# Patient Record
Sex: FEMALE | Race: WHITE | Employment: OTHER | URBAN - METROPOLITAN AREA
[De-identification: names, ages, dates, MRNs, and addresses within clinical notes are randomized per-mention and may not be internally consistent; named-entity substitution may affect disease eponyms.]

---

## 2017-01-20 PROCEDURE — 87077 CULTURE AEROBIC IDENTIFY: CPT | Performed by: UROLOGY

## 2017-01-20 PROCEDURE — 87186 SC STD MICRODIL/AGAR DIL: CPT | Performed by: UROLOGY

## 2017-01-20 PROCEDURE — 87086 URINE CULTURE/COLONY COUNT: CPT | Performed by: UROLOGY

## 2017-05-19 PROCEDURE — 81003 URINALYSIS AUTO W/O SCOPE: CPT | Performed by: UROLOGY

## 2017-06-14 ENCOUNTER — APPOINTMENT (OUTPATIENT)
Dept: ULTRASOUND IMAGING | Facility: HOSPITAL | Age: 82
End: 2017-06-14
Attending: INTERNAL MEDICINE
Payer: MEDICARE

## 2017-06-14 ENCOUNTER — APPOINTMENT (OUTPATIENT)
Dept: CT IMAGING | Facility: HOSPITAL | Age: 82
End: 2017-06-14
Attending: EMERGENCY MEDICINE
Payer: MEDICARE

## 2017-06-14 ENCOUNTER — APPOINTMENT (OUTPATIENT)
Dept: GENERAL RADIOLOGY | Facility: HOSPITAL | Age: 82
End: 2017-06-14
Attending: EMERGENCY MEDICINE
Payer: MEDICARE

## 2017-06-14 ENCOUNTER — HOSPITAL ENCOUNTER (OUTPATIENT)
Facility: HOSPITAL | Age: 82
Setting detail: OBSERVATION
Discharge: HOME | End: 2017-06-15
Attending: EMERGENCY MEDICINE | Admitting: INTERNAL MEDICINE
Payer: MEDICARE

## 2017-06-14 DIAGNOSIS — I48.20 ATRIAL FIBRILLATION, CHRONIC (HCC): ICD-10-CM

## 2017-06-14 DIAGNOSIS — R10.9 ABDOMINAL PAIN OF UNKNOWN ETIOLOGY: ICD-10-CM

## 2017-06-14 DIAGNOSIS — K59.00 CONSTIPATION, UNSPECIFIED CONSTIPATION TYPE: ICD-10-CM

## 2017-06-14 DIAGNOSIS — R07.9 ACUTE CHEST PAIN: Primary | ICD-10-CM

## 2017-06-14 PROCEDURE — 81003 URINALYSIS AUTO W/O SCOPE: CPT | Performed by: INTERNAL MEDICINE

## 2017-06-14 PROCEDURE — 80162 ASSAY OF DIGOXIN TOTAL: CPT | Performed by: EMERGENCY MEDICINE

## 2017-06-14 PROCEDURE — 99285 EMERGENCY DEPT VISIT HI MDM: CPT

## 2017-06-14 PROCEDURE — 84484 ASSAY OF TROPONIN QUANT: CPT | Performed by: EMERGENCY MEDICINE

## 2017-06-14 PROCEDURE — 85025 COMPLETE CBC W/AUTO DIFF WBC: CPT | Performed by: EMERGENCY MEDICINE

## 2017-06-14 PROCEDURE — 85730 THROMBOPLASTIN TIME PARTIAL: CPT | Performed by: EMERGENCY MEDICINE

## 2017-06-14 PROCEDURE — 93970 EXTREMITY STUDY: CPT | Performed by: INTERNAL MEDICINE

## 2017-06-14 PROCEDURE — 84484 ASSAY OF TROPONIN QUANT: CPT | Performed by: HOSPITALIST

## 2017-06-14 PROCEDURE — 80162 ASSAY OF DIGOXIN TOTAL: CPT | Performed by: INTERNAL MEDICINE

## 2017-06-14 PROCEDURE — 86901 BLOOD TYPING SEROLOGIC RH(D): CPT | Performed by: EMERGENCY MEDICINE

## 2017-06-14 PROCEDURE — 86850 RBC ANTIBODY SCREEN: CPT | Performed by: EMERGENCY MEDICINE

## 2017-06-14 PROCEDURE — 93005 ELECTROCARDIOGRAM TRACING: CPT

## 2017-06-14 PROCEDURE — 74177 CT ABD & PELVIS W/CONTRAST: CPT | Performed by: EMERGENCY MEDICINE

## 2017-06-14 PROCEDURE — 83690 ASSAY OF LIPASE: CPT | Performed by: EMERGENCY MEDICINE

## 2017-06-14 PROCEDURE — 85652 RBC SED RATE AUTOMATED: CPT | Performed by: INTERNAL MEDICINE

## 2017-06-14 PROCEDURE — 96360 HYDRATION IV INFUSION INIT: CPT

## 2017-06-14 PROCEDURE — 71010 XR CHEST AP PORTABLE  (CPT=71010): CPT | Performed by: EMERGENCY MEDICINE

## 2017-06-14 PROCEDURE — 83880 ASSAY OF NATRIURETIC PEPTIDE: CPT | Performed by: INTERNAL MEDICINE

## 2017-06-14 PROCEDURE — 93010 ELECTROCARDIOGRAM REPORT: CPT

## 2017-06-14 PROCEDURE — 96361 HYDRATE IV INFUSION ADD-ON: CPT

## 2017-06-14 PROCEDURE — 82550 ASSAY OF CK (CPK): CPT | Performed by: HOSPITALIST

## 2017-06-14 PROCEDURE — 80053 COMPREHEN METABOLIC PANEL: CPT | Performed by: EMERGENCY MEDICINE

## 2017-06-14 PROCEDURE — 85610 PROTHROMBIN TIME: CPT | Performed by: EMERGENCY MEDICINE

## 2017-06-14 PROCEDURE — 84443 ASSAY THYROID STIM HORMONE: CPT | Performed by: INTERNAL MEDICINE

## 2017-06-14 PROCEDURE — 86900 BLOOD TYPING SEROLOGIC ABO: CPT | Performed by: EMERGENCY MEDICINE

## 2017-06-14 RX ORDER — WARFARIN SODIUM 2 MG/1
3.5 TABLET ORAL
COMMUNITY
End: 2018-03-19

## 2017-06-14 RX ORDER — ALPRAZOLAM 0.25 MG/1
0.25 TABLET ORAL NIGHTLY PRN
Status: DISCONTINUED | OUTPATIENT
Start: 2017-06-14 | End: 2017-06-15

## 2017-06-14 RX ORDER — WARFARIN SODIUM 2 MG/1
2 TABLET ORAL AS DIRECTED
COMMUNITY
End: 2017-08-30

## 2017-06-14 RX ORDER — ALPRAZOLAM 0.25 MG/1
0.25 TABLET ORAL NIGHTLY PRN
COMMUNITY
End: 2017-08-29

## 2017-06-14 RX ORDER — BUPRENORPHINE HCL 8 MG/1
1 TABLET SUBLINGUAL DAILY
COMMUNITY

## 2017-06-14 RX ORDER — CETIRIZINE HYDROCHLORIDE 10 MG/1
10 TABLET ORAL NIGHTLY
COMMUNITY

## 2017-06-14 RX ORDER — DONEPEZIL HYDROCHLORIDE 10 MG/1
10 TABLET, FILM COATED ORAL NIGHTLY
Status: DISCONTINUED | OUTPATIENT
Start: 2017-06-14 | End: 2017-06-15

## 2017-06-14 RX ORDER — BISACODYL 10 MG
10 SUPPOSITORY, RECTAL RECTAL
Status: DISCONTINUED | OUTPATIENT
Start: 2017-06-14 | End: 2017-06-15

## 2017-06-14 RX ORDER — HYDROMORPHONE HYDROCHLORIDE 1 MG/ML
0.5 INJECTION, SOLUTION INTRAMUSCULAR; INTRAVENOUS; SUBCUTANEOUS EVERY 30 MIN PRN
Status: ACTIVE | OUTPATIENT
Start: 2017-06-14 | End: 2017-06-14

## 2017-06-14 RX ORDER — CHOLESTYRAMINE LIGHT 4 G/5.7G
4 POWDER, FOR SUSPENSION ORAL DAILY
Status: DISCONTINUED | OUTPATIENT
Start: 2017-06-14 | End: 2017-06-15

## 2017-06-14 RX ORDER — GABAPENTIN 300 MG/1
600 CAPSULE ORAL NIGHTLY
COMMUNITY

## 2017-06-14 RX ORDER — TRIAMTERENE AND HYDROCHLOROTHIAZIDE 37.5; 25 MG/1; MG/1
1 CAPSULE ORAL EVERY MORNING
COMMUNITY
End: 2017-12-09

## 2017-06-14 RX ORDER — WARFARIN SODIUM 2 MG/1
4 TABLET ORAL
Status: COMPLETED | OUTPATIENT
Start: 2017-06-14 | End: 2017-06-14

## 2017-06-14 RX ORDER — POTASSIUM CHLORIDE 20 MEQ/1
40 TABLET, EXTENDED RELEASE ORAL EVERY 4 HOURS
Status: COMPLETED | OUTPATIENT
Start: 2017-06-14 | End: 2017-06-14

## 2017-06-14 RX ORDER — SODIUM PHOSPHATE, DIBASIC AND SODIUM PHOSPHATE, MONOBASIC 7; 19 G/133ML; G/133ML
1 ENEMA RECTAL ONCE AS NEEDED
Status: DISCONTINUED | OUTPATIENT
Start: 2017-06-14 | End: 2017-06-15

## 2017-06-14 RX ORDER — TIZANIDINE 2 MG/1
2 TABLET ORAL NIGHTLY
Status: DISCONTINUED | OUTPATIENT
Start: 2017-06-14 | End: 2017-06-15

## 2017-06-14 RX ORDER — POLYETHYLENE GLYCOL 3350 17 G/17G
17 POWDER, FOR SOLUTION ORAL DAILY PRN
Status: DISCONTINUED | OUTPATIENT
Start: 2017-06-14 | End: 2017-06-15

## 2017-06-14 RX ORDER — ASPIRIN 81 MG/1
324 TABLET, CHEWABLE ORAL ONCE
Status: COMPLETED | OUTPATIENT
Start: 2017-06-14 | End: 2017-06-14

## 2017-06-14 RX ORDER — MONTELUKAST SODIUM 10 MG/1
10 TABLET ORAL NIGHTLY
COMMUNITY

## 2017-06-14 RX ORDER — DICYCLOMINE HCL 20 MG
40 TABLET ORAL
Status: DISCONTINUED | OUTPATIENT
Start: 2017-06-14 | End: 2017-06-15

## 2017-06-14 RX ORDER — ACETAMINOPHEN 325 MG/1
650 TABLET ORAL EVERY 6 HOURS PRN
Status: DISCONTINUED | OUTPATIENT
Start: 2017-06-14 | End: 2017-06-15

## 2017-06-14 RX ORDER — AMITRIPTYLINE HYDROCHLORIDE 50 MG/1
50 TABLET, FILM COATED ORAL NIGHTLY
Status: DISCONTINUED | OUTPATIENT
Start: 2017-06-14 | End: 2017-06-15

## 2017-06-14 RX ORDER — MONTELUKAST SODIUM 10 MG/1
10 TABLET ORAL NIGHTLY
Status: DISCONTINUED | OUTPATIENT
Start: 2017-06-14 | End: 2017-06-15

## 2017-06-14 RX ORDER — DILTIAZEM HYDROCHLORIDE 180 MG/1
180 CAPSULE, EXTENDED RELEASE ORAL 2 TIMES DAILY
Status: DISCONTINUED | OUTPATIENT
Start: 2017-06-14 | End: 2017-06-15

## 2017-06-14 RX ORDER — DIGOXIN 125 MCG
125 TABLET ORAL DAILY
Status: DISCONTINUED | OUTPATIENT
Start: 2017-06-14 | End: 2017-06-15

## 2017-06-14 RX ORDER — ONDANSETRON 2 MG/ML
4 INJECTION INTRAMUSCULAR; INTRAVENOUS EVERY 4 HOURS PRN
Status: DISCONTINUED | OUTPATIENT
Start: 2017-06-14 | End: 2017-06-14

## 2017-06-14 RX ORDER — GABAPENTIN 300 MG/1
600 CAPSULE ORAL NIGHTLY
Status: DISCONTINUED | OUTPATIENT
Start: 2017-06-14 | End: 2017-06-15

## 2017-06-14 RX ORDER — DOCUSATE SODIUM 100 MG/1
100 CAPSULE, LIQUID FILLED ORAL 2 TIMES DAILY
Status: DISCONTINUED | OUTPATIENT
Start: 2017-06-14 | End: 2017-06-15

## 2017-06-14 RX ORDER — METHENAMINE HIPPURATE 1000 MG/1
1 TABLET ORAL DAILY
Status: DISCONTINUED | OUTPATIENT
Start: 2017-06-14 | End: 2017-06-15

## 2017-06-14 RX ORDER — TRIAMTERENE AND HYDROCHLOROTHIAZIDE 37.5; 25 MG/1; MG/1
1 CAPSULE ORAL EVERY MORNING
Status: DISCONTINUED | OUTPATIENT
Start: 2017-06-14 | End: 2017-06-15

## 2017-06-14 RX ORDER — SODIUM CHLORIDE 9 MG/ML
1000 INJECTION, SOLUTION INTRAVENOUS ONCE
Status: COMPLETED | OUTPATIENT
Start: 2017-06-14 | End: 2017-06-14

## 2017-06-14 RX ORDER — SODIUM CHLORIDE 9 MG/ML
INJECTION, SOLUTION INTRAVENOUS CONTINUOUS
Status: ACTIVE | OUTPATIENT
Start: 2017-06-14 | End: 2017-06-14

## 2017-06-14 RX ORDER — CETIRIZINE HYDROCHLORIDE 10 MG/1
10 TABLET ORAL NIGHTLY
Status: DISCONTINUED | OUTPATIENT
Start: 2017-06-14 | End: 2017-06-15

## 2017-06-14 RX ORDER — ONDANSETRON 2 MG/ML
4 INJECTION INTRAMUSCULAR; INTRAVENOUS EVERY 6 HOURS PRN
Status: DISCONTINUED | OUTPATIENT
Start: 2017-06-14 | End: 2017-06-15

## 2017-06-14 NOTE — ED PROVIDER NOTES
Patient Seen in: [facility] Emergency Department    History   Patient presents with:  GI Bleeding (gastrointestinal)    Stated Complaint: GI bleeding    HPI    Patient is a 66-year-old female who states that since yesterday she has had abdominal jj nightly. Warfarin Sodium 2 MG Oral Tab,  Take 4 mg by mouth 3 (three) times a week. Mon, wed, fri   Warfarin Sodium 2 MG Oral Tab,  Take 2 mg by mouth As Directed. Tues, thurs, sat, sun   gabapentin 300 MG Oral Cap,  Take 600 mg by mouth nightly.    cetir Comment: none      Review of Systems    Positive for stated complaint: GI bleeding  Other systems are as noted in HPI. Constitutional and vital signs reviewed. All other systems reviewed and negative except as noted above.     PSFH elements reviewed f for the following:     PTT 40.6 (*)     All other components within normal limits    Narrative: The aPTT Heparin Therapeutic Range is approximately 65- 104 seconds. The therapeutic range has been validated against 0.3-0.7 heparin anti-Xa units/mL. Fibrillation  Reading: Atrial fibrillation, nonspecific ST changes          CT abdomen pelvisCONCLUSION:  Large amount of gas and stool throughout the colon limiting evaluation for underlying colonic mass .     Colonic diverticulosis.     Mild splenomegaly

## 2017-06-14 NOTE — ED INITIAL ASSESSMENT (HPI)
Pt c/o dark stools since last night and abdominal pain that radiates to left shoulder pain since this AM. Denies N/V/fever.  + diarrhea

## 2017-06-14 NOTE — PLAN OF CARE
ASSUMED CARE OF PATIENT WHO IS A/O X 3 BUT SOMEWHAT FORGETFUL. LUNGS CLEAR ON RA. TELE IN AFIB 60S/70S. DAUGHTER AT BED SIDE. K NOTED OF 3.5 AND REPLACED. ISOLATION INITIATED FOR CURRENT LOOSE STOOLS. REPEAT MYOCAS FOR 1530 AND 2130.

## 2017-06-14 NOTE — H&P
[facility] Hospitalist H&P       CC: abdominal pain and chest pain    PCP: Abhilash Hickman MD    History of Present Illness:   Pt is an 79 yo with asthma, atrial fibrillation on coumadin, breast cancer in remission, recurrent UTIs, who is admitted for abdom Disp:  Rfl:    Warfarin Sodium 2 MG Oral Tab Take 4 mg by mouth 3 (three) times a week. Mon, wed, fri Disp:  Rfl:    Warfarin Sodium 2 MG Oral Tab Take 2 mg by mouth As Directed.  Tues, thurs, sat, sun Disp:  Rfl:    gabapentin 300 MG Oral Cap Take 600 mg b Rfl:          Soc Hx     Smoking status: Never Smoker     Smokeless tobacco: Never Used    Alcohol Use: No    Comment: none        Fam Hx  History reviewed. No pertinent family history.     Review of Systems  Comprehensive ROS reviewed and negative except radiograph was obtained. COMPARISON:  [facility] , XR CHEST AP PORTABLE  (CPT=71010), 12/23/2014, 17:31. INDICATIONS:  GI bleeding  PATIENT STATED HISTORY: (As transcribed by Technologist)  Patient offered no additional history at this time.     FINDINGS:  No reviewed. Further recommendations pending patient's clinical course.   [facility] hospitalist to continue to follow patient while in house    Patient and/or patient's family given opportunity to ask questions and note understanding and agreeing with therapeuti

## 2017-06-14 NOTE — PROGRESS NOTES
06/14/17 1325 06/14/17 1328 06/14/17 1330   Vital Signs   Pulse 63 75 71   Heart Rate Source Monitor Monitor Monitor   Cardiac Rhythm Atrial fibrillation Atrial fibrillation Atrial fibrillation   Resp 16 16 16   Respiratory Quality Normal Normal Normal

## 2017-06-14 NOTE — PROGRESS NOTES
Brief Internal Medicine Note    Full Note to Follow      Pt is an 81 yo with asthma, atrial fibrillation on coumadin, breast cancer in remission, recurrent UTIs, who is admitted for abdominal and chest pain.   Pt reports she awoke from sleep this AM d/t m

## 2017-06-14 NOTE — ED NOTES
Pt sitting up on stretcher NAD pt awaiting admit.  Family at bs with pt laughing and family laughing

## 2017-06-14 NOTE — HISTORICAL OFFICE NOTE
Lidia Main  : 1931  ACCOUNT:  [id number]  [phone]  PCP: Dr. Kaylan Chambers     TODAY'S DATE: 2016  DICTATED BY:  Savanna Green MD]    CHIEF COMPLAINT: [Followup of Chronic atrial fibrillation.   Pacemaker follow-up. ]    HPI: [On / below    VITAL SIGNS: [B/P - 110/62, Pulse - 60, Weight - 138, Height - 66, BMI - 22.3 ]    DECISION MAKING: Trini Chauhan is doing well from a cardiac standpoint. She is well compensated. She has no symptoms of angina or heart failure.   Pacemaker device is uvaldo 10/23/14 Cefuroxime Axetil     250MG     1 tab daily                              10/23/14 Dicyclomine HCl       20MG      2 tab daily                              10/23/14 Donepezil HCl         10MG      1 tab daily in [place]

## 2017-06-14 NOTE — PROGRESS NOTES
[facility] Dosing Service  Warfarin (Coumadin) Initial Dosing    Salvador Do is a 80year old female for whom pharmacy has been consulted to dose warfarin (COUMADIN) for A. Fib by Dr. Aj Do. Based on this indication, goal INR is 2-3.     Potential

## 2017-06-14 NOTE — CONSULTS
[facility]  Report of Consultation    Chadwick Larsen Patient Status:  Emergency    1931 MRN [id number]   Location [address] [facility] Attending Anastasia Burton MD   Hosp Day # 0 PCP Kaylan Chambers MD     Reason for Consultation Cystoscopy- Dr. Cecilia Escamilla     History reviewed. No pertinent family history. reports that she has never smoked. She has never used smokeless tobacco. She reports that she does not drink alcohol or use illicit drugs.     Allergies:    Erythromycin Base       Sourav tract infection)     Diverticula, colon     Straining to void, suspect bladder atony     Urinary tract infection     Acute chest pain      1. Abdominal pain with CT scan showing gas and stool throughout colon  2. History of asthma  3.  History atrial fibril

## 2017-06-14 NOTE — PROGRESS NOTES
Patient arrived to room via stretcher from ED. Oriented to room. Admission assessment and orthostatic vital signs completed. Family at bedside, updated with POC,.

## 2017-06-15 ENCOUNTER — APPOINTMENT (OUTPATIENT)
Dept: CV DIAGNOSTICS | Facility: HOSPITAL | Age: 82
End: 2017-06-15
Attending: INTERNAL MEDICINE
Payer: MEDICARE

## 2017-06-15 ENCOUNTER — PRIOR ORIGINAL RECORDS (OUTPATIENT)
Dept: OTHER | Age: 82
End: 2017-06-15

## 2017-06-15 VITALS
OXYGEN SATURATION: 95 % | DIASTOLIC BLOOD PRESSURE: 67 MMHG | RESPIRATION RATE: 18 BRPM | BODY MASS INDEX: 21.4 KG/M2 | WEIGHT: 133.19 LBS | TEMPERATURE: 98 F | SYSTOLIC BLOOD PRESSURE: 124 MMHG | HEIGHT: 66 IN | HEART RATE: 70 BPM

## 2017-06-15 PROCEDURE — 93005 ELECTROCARDIOGRAM TRACING: CPT

## 2017-06-15 PROCEDURE — 85610 PROTHROMBIN TIME: CPT | Performed by: INTERNAL MEDICINE

## 2017-06-15 PROCEDURE — 84484 ASSAY OF TROPONIN QUANT: CPT | Performed by: INTERNAL MEDICINE

## 2017-06-15 PROCEDURE — 82550 ASSAY OF CK (CPK): CPT | Performed by: INTERNAL MEDICINE

## 2017-06-15 PROCEDURE — 85025 COMPLETE CBC W/AUTO DIFF WBC: CPT | Performed by: INTERNAL MEDICINE

## 2017-06-15 PROCEDURE — 80048 BASIC METABOLIC PNL TOTAL CA: CPT | Performed by: INTERNAL MEDICINE

## 2017-06-15 PROCEDURE — 80061 LIPID PANEL: CPT | Performed by: INTERNAL MEDICINE

## 2017-06-15 PROCEDURE — 83735 ASSAY OF MAGNESIUM: CPT | Performed by: INTERNAL MEDICINE

## 2017-06-15 PROCEDURE — 84132 ASSAY OF SERUM POTASSIUM: CPT | Performed by: HOSPITALIST

## 2017-06-15 PROCEDURE — 93306 TTE W/DOPPLER COMPLETE: CPT | Performed by: INTERNAL MEDICINE

## 2017-06-15 PROCEDURE — 93010 ELECTROCARDIOGRAM REPORT: CPT | Performed by: INTERNAL MEDICINE

## 2017-06-15 RX ORDER — SIMETHICONE 80 MG
80 TABLET,CHEWABLE ORAL
Qty: 30 TABLET | Refills: 0 | Status: SHIPPED | OUTPATIENT
Start: 2017-06-15 | End: 2017-11-20

## 2017-06-15 RX ORDER — SIMETHICONE 80 MG
80 TABLET,CHEWABLE ORAL
Status: DISCONTINUED | OUTPATIENT
Start: 2017-06-15 | End: 2017-06-15

## 2017-06-15 RX ORDER — PSEUDOEPHEDRINE HCL 30 MG
100 TABLET ORAL 2 TIMES DAILY
Qty: 30 CAPSULE | Refills: 0 | Status: ON HOLD | OUTPATIENT
Start: 2017-06-15 | End: 2017-12-27

## 2017-06-15 RX ORDER — WARFARIN SODIUM 2 MG/1
4 TABLET ORAL
Status: DISCONTINUED | OUTPATIENT
Start: 2017-06-15 | End: 2017-06-15

## 2017-06-15 RX ORDER — WARFARIN SODIUM 2 MG/1
2 TABLET ORAL NIGHTLY
Status: DISCONTINUED | OUTPATIENT
Start: 2017-06-15 | End: 2017-06-15

## 2017-06-15 NOTE — PROGRESS NOTES
[facility] Hospitalist Progress Note     PCP: Emily Retana MD    CC:  Follow up    SUBJECTIVE:  Pt sitting up in bed, eating breakfast.  Says she feels much better today. Feels gassy but has not had BM overnight. No chest pain currently. No n/v/f/c.   Say Sodium  2 mg Oral Nightly   • simethicone  80 mg Oral TID CC and HS   • Amitriptyline HCl  50 mg Oral Nightly   • DilTIAZem HCl ER Coated Beads  180 mg Oral BID   • cetirizine  10 mg Oral Nightly   • cholestyramine light  4 g Oral Daily   • Dicyclomine HCl ok, ok for d/c from IM standpoint    Questions/concerns were discussed with patient and/or family by bedside.       Thank Shawn Perkins MD    [facility] Hospitalist  Pager: [phone]

## 2017-06-15 NOTE — PROGRESS NOTES
[facility]  Cardiology Progress Note    Carrie Perea Patient Status:  Observation    1931 MRN [id number]   [facility] 2NE-A Attending Ramandeep Constantino, *   Hosp Day # 1 PCP Luba Coffman MD     Subjective:  No complain Amitriptyline HCl  50 mg Oral Nightly   • DilTIAZem HCl ER Coated Beads  180 mg Oral BID   • cetirizine  10 mg Oral Nightly   • cholestyramine light  4 g Oral Daily   • Dicyclomine HCl  40 mg Oral Daily   • digoxin  125 mcg Oral Daily   • Donepezil HCl  10

## 2017-06-15 NOTE — CM/SW NOTE
CM informed from pt's nurse regarding pt maybe current with a home care agency prior to admission. Pt is from [facility] living with spouse.  CM called [facility] home care agency with facility and confirmed pt is not active with their home health agen

## 2017-06-15 NOTE — PROGRESS NOTES
Dr Clarisse Duke reports pt ok for discharged if gas/bowel issues resolved and if ok with cardiology. Pt had formed BM today and had one dose of simethicone. Lab called to report that stool for cdiff can't be run due to stool was formed.   Occult blood result n

## 2017-06-15 NOTE — PROGRESS NOTES
[address] [facility] Dosing Service  Warfarin (Coumadin) Subsequent Dosing    Markell Rai is a 80year old female for whom pharmacy has been dosing warfarin (Coumadin).  Goal INR is 2-3    Recent Labs   Lab  06/14/17   0824  06/15/17   0446   INR  2.07*  1.79*

## 2017-07-12 ENCOUNTER — PRIOR ORIGINAL RECORDS (OUTPATIENT)
Dept: OTHER | Age: 82
End: 2017-07-12

## 2017-07-17 PROBLEM — R10.9 ABDOMINAL PAIN OF UNKNOWN ETIOLOGY: Status: RESOLVED | Noted: 2017-06-14 | Resolved: 2017-07-17

## 2017-07-17 PROBLEM — F32.A DEPRESSION, UNSPECIFIED DEPRESSION TYPE: Status: ACTIVE | Noted: 2017-07-17

## 2017-07-17 PROBLEM — R60.9 EDEMA, UNSPECIFIED TYPE: Status: ACTIVE | Noted: 2017-07-17

## 2017-07-17 PROBLEM — M19.90 OSTEOARTHRITIS, UNSPECIFIED OSTEOARTHRITIS TYPE, UNSPECIFIED SITE: Status: ACTIVE | Noted: 2017-07-17

## 2017-07-17 PROBLEM — E73.9 LACTOSE INTOLERANCE: Status: ACTIVE | Noted: 2017-07-17

## 2017-07-17 PROBLEM — F03.90 DEMENTIA WITHOUT BEHAVIORAL DISTURBANCE, UNSPECIFIED DEMENTIA TYPE (HCC): Status: ACTIVE | Noted: 2017-07-17

## 2017-07-17 PROBLEM — Z85.3 HISTORY OF BREAST CANCER IN FEMALE: Status: ACTIVE | Noted: 2017-07-17

## 2017-07-17 PROBLEM — R07.9 ACUTE CHEST PAIN: Status: RESOLVED | Noted: 2017-06-14 | Resolved: 2017-07-17

## 2017-07-17 PROBLEM — J30.89 CHRONIC NON-SEASONAL ALLERGIC RHINITIS, UNSPECIFIED TRIGGER: Status: ACTIVE | Noted: 2017-07-17

## 2017-07-17 PROBLEM — N39.0 CHRONIC UTI: Status: ACTIVE | Noted: 2017-07-17

## 2017-07-17 PROBLEM — K59.00 CONSTIPATION, UNSPECIFIED CONSTIPATION TYPE: Status: RESOLVED | Noted: 2017-06-14 | Resolved: 2017-07-17

## 2017-07-17 PROBLEM — G62.9 NEUROPATHY: Status: ACTIVE | Noted: 2017-07-17

## 2017-07-19 LAB
BUN: 11 MG/DL
CALCIUM: 8.9 MG/DL
CHLORIDE: 104 MEQ/L
CHOLESTEROL, TOTAL: 142 MG/DL
CREATININE KINASE: 41 U/L
CREATININE, SERUM: 0.75 MG/DL
GLUCOSE: 98 MG/DL
HDL CHOLESTEROL: 27 MG/DL
LDL CHOLESTEROL: 71 MG/DL
POTASSIUM, SERUM: 4.2 MEQ/L
SODIUM: 139 MEQ/L
TRIGLYCERIDES: 220 MG/DL

## 2017-09-17 PROBLEM — M85.88 OSTEOPENIA OF SPINE: Status: ACTIVE | Noted: 2017-09-17

## 2017-09-21 ENCOUNTER — PRIOR ORIGINAL RECORDS (OUTPATIENT)
Dept: OTHER | Age: 82
End: 2017-09-21

## 2017-12-06 ENCOUNTER — APPOINTMENT (OUTPATIENT)
Dept: GENERAL RADIOLOGY | Facility: HOSPITAL | Age: 82
DRG: 544 | End: 2017-12-06
Payer: MEDICARE

## 2017-12-06 ENCOUNTER — HOSPITAL ENCOUNTER (INPATIENT)
Facility: HOSPITAL | Age: 82
LOS: 3 days | Discharge: NURSING, REHAB OR LONG-TERM CARE | DRG: 544 | End: 2017-12-09
Attending: EMERGENCY MEDICINE | Admitting: INTERNAL MEDICINE
Payer: MEDICARE

## 2017-12-06 ENCOUNTER — APPOINTMENT (OUTPATIENT)
Dept: CT IMAGING | Facility: HOSPITAL | Age: 82
DRG: 544 | End: 2017-12-06
Attending: EMERGENCY MEDICINE
Payer: MEDICARE
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
## 2017-12-06 DIAGNOSIS — S32.010A CLOSED COMPRESSION FRACTURE OF FIRST LUMBAR VERTEBRA, INITIAL ENCOUNTER: Primary | ICD-10-CM

## 2017-12-06 PROCEDURE — 99285 EMERGENCY DEPT VISIT HI MDM: CPT

## 2017-12-06 PROCEDURE — 72131 CT LUMBAR SPINE W/O DYE: CPT | Performed by: EMERGENCY MEDICINE

## 2017-12-06 PROCEDURE — 86901 BLOOD TYPING SEROLOGIC RH(D): CPT | Performed by: EMERGENCY MEDICINE

## 2017-12-06 PROCEDURE — 85610 PROTHROMBIN TIME: CPT | Performed by: EMERGENCY MEDICINE

## 2017-12-06 PROCEDURE — 86850 RBC ANTIBODY SCREEN: CPT | Performed by: EMERGENCY MEDICINE

## 2017-12-06 PROCEDURE — 85025 COMPLETE CBC W/AUTO DIFF WBC: CPT | Performed by: EMERGENCY MEDICINE

## 2017-12-06 PROCEDURE — 86900 BLOOD TYPING SEROLOGIC ABO: CPT | Performed by: EMERGENCY MEDICINE

## 2017-12-06 PROCEDURE — 96376 TX/PRO/DX INJ SAME DRUG ADON: CPT

## 2017-12-06 PROCEDURE — 72110 X-RAY EXAM L-2 SPINE 4/>VWS: CPT | Performed by: EMERGENCY MEDICINE

## 2017-12-06 PROCEDURE — 72190 X-RAY EXAM OF PELVIS: CPT | Performed by: EMERGENCY MEDICINE

## 2017-12-06 PROCEDURE — 96374 THER/PROPH/DIAG INJ IV PUSH: CPT

## 2017-12-06 PROCEDURE — 80053 COMPREHEN METABOLIC PANEL: CPT | Performed by: EMERGENCY MEDICINE

## 2017-12-06 RX ORDER — HYDROMORPHONE HYDROCHLORIDE 1 MG/ML
0.5 INJECTION, SOLUTION INTRAMUSCULAR; INTRAVENOUS; SUBCUTANEOUS EVERY 30 MIN PRN
Status: ACTIVE | OUTPATIENT
Start: 2017-12-06 | End: 2017-12-07

## 2017-12-06 RX ORDER — ACETAMINOPHEN 325 MG/1
650 TABLET ORAL EVERY 6 HOURS PRN
Status: DISCONTINUED | OUTPATIENT
Start: 2017-12-06 | End: 2017-12-09

## 2017-12-06 RX ORDER — SODIUM CHLORIDE 9 MG/ML
INJECTION, SOLUTION INTRAVENOUS CONTINUOUS
Status: ACTIVE | OUTPATIENT
Start: 2017-12-06 | End: 2017-12-07

## 2017-12-06 RX ORDER — HYDROMORPHONE HYDROCHLORIDE 1 MG/ML
0.4 INJECTION, SOLUTION INTRAMUSCULAR; INTRAVENOUS; SUBCUTANEOUS EVERY 2 HOUR PRN
Status: DISCONTINUED | OUTPATIENT
Start: 2017-12-06 | End: 2017-12-07

## 2017-12-06 RX ORDER — ONDANSETRON 2 MG/ML
4 INJECTION INTRAMUSCULAR; INTRAVENOUS EVERY 4 HOURS PRN
Status: DISCONTINUED | OUTPATIENT
Start: 2017-12-06 | End: 2017-12-07 | Stop reason: HOSPADM

## 2017-12-06 RX ORDER — TRAMADOL HYDROCHLORIDE 50 MG/1
50 TABLET ORAL EVERY 6 HOURS PRN
Status: DISCONTINUED | OUTPATIENT
Start: 2017-12-06 | End: 2017-12-09

## 2017-12-06 RX ORDER — HYDROMORPHONE HYDROCHLORIDE 1 MG/ML
0.2 INJECTION, SOLUTION INTRAMUSCULAR; INTRAVENOUS; SUBCUTANEOUS EVERY 2 HOUR PRN
Status: DISCONTINUED | OUTPATIENT
Start: 2017-12-06 | End: 2017-12-09

## 2017-12-06 RX ORDER — HYDROMORPHONE HYDROCHLORIDE 1 MG/ML
0.5 INJECTION, SOLUTION INTRAMUSCULAR; INTRAVENOUS; SUBCUTANEOUS ONCE
Status: COMPLETED | OUTPATIENT
Start: 2017-12-06 | End: 2017-12-06

## 2017-12-07 PROCEDURE — L0472 TLSO RIGID FRAME HYPEREX PRE: HCPCS | Performed by: ORTHOPAEDIC SURGERY

## 2017-12-07 PROCEDURE — 83735 ASSAY OF MAGNESIUM: CPT | Performed by: HOSPITALIST

## 2017-12-07 PROCEDURE — 80048 BASIC METABOLIC PNL TOTAL CA: CPT | Performed by: HOSPITALIST

## 2017-12-07 PROCEDURE — 85025 COMPLETE CBC W/AUTO DIFF WBC: CPT | Performed by: HOSPITALIST

## 2017-12-07 PROCEDURE — 85610 PROTHROMBIN TIME: CPT | Performed by: HOSPITALIST

## 2017-12-07 PROCEDURE — 87086 URINE CULTURE/COLONY COUNT: CPT | Performed by: HOSPITALIST

## 2017-12-07 PROCEDURE — 81001 URINALYSIS AUTO W/SCOPE: CPT | Performed by: HOSPITALIST

## 2017-12-07 RX ORDER — MAGNESIUM HYDROXIDE/ALUMINUM HYDROXICE/SIMETHICONE 120; 1200; 1200 MG/30ML; MG/30ML; MG/30ML
30 SUSPENSION ORAL 4 TIMES DAILY PRN
Status: DISCONTINUED | OUTPATIENT
Start: 2017-12-07 | End: 2017-12-09

## 2017-12-07 RX ORDER — MAGNESIUM SULFATE HEPTAHYDRATE 40 MG/ML
2 INJECTION, SOLUTION INTRAVENOUS ONCE
Status: COMPLETED | OUTPATIENT
Start: 2017-12-07 | End: 2017-12-07

## 2017-12-07 RX ORDER — KETOROLAC TROMETHAMINE 15 MG/ML
15 INJECTION, SOLUTION INTRAMUSCULAR; INTRAVENOUS EVERY 6 HOURS
Status: DISPENSED | OUTPATIENT
Start: 2017-12-07 | End: 2017-12-09

## 2017-12-07 RX ORDER — ACETAMINOPHEN AND CODEINE PHOSPHATE 300; 30 MG/1; MG/1
1 TABLET ORAL EVERY 4 HOURS PRN
Status: DISCONTINUED | OUTPATIENT
Start: 2017-12-07 | End: 2017-12-09

## 2017-12-07 NOTE — H&P
[facility] Hospitalist History and Physical      Patient presents with:  Back Pain (musculoskeletal)       PCP: Ac Pacheco MD      History of Present Illness: Patient is a 80year old female with PMH sig for asthma, afib on coumadin, breast ca in remission who CYSTOURETHROSCOPY      Comment: Flow US, Cystoscopy- Dr. Christiano Metzger: HERNIA SURGERY      Comment: repair  1979: HYSTERECTOMY      Comment: 1 ovary left  1954: OTHER SURGICAL HISTORY      Comment: ovarian cyst removed during 1st pregnancy  No date: Angely Zafar Results  Component Value Date   WBC 6.3 12/07/2017   HGB 13.2 12/07/2017   HCT 37.9 12/07/2017   .0 12/07/2017   CREATSERUM 0.85 12/07/2017   BUN 12 12/07/2017    12/07/2017   K 3.5 12/07/2017    12/07/2017   CO2 30.0 12/07/2017   GLU 11 12/06/2017, 19:03. JENNIFFER , CT PELVIS(CONTRAST ONLY) (CPT=72193), 6/27/2015, 22:04. INDICATIONS:  back pain  TECHNIQUE:  Noncontrast CT scanning is performed through the lumbar spine. Multiplanar reconstructions are generated.   Dose reduction techniques spinal canal or neural foraminal stenosis. CONCLUSION:  1. No acute fractures. 2.  Stable compression deformity of the superior endplate of the L1 vertebral body. 3.  Degenerative disc disease as described above.  4.  Colonic diverticulosis without ev anticipate that, after discharge, patient will require TBD.

## 2017-12-07 NOTE — PROGRESS NOTES
NURSING ADMISSION NOTE      Patient admitted via Cart  Oriented to room. Safety precautions initiated. Bed in low position. Call light in reach.     Admissions navigator completed  Admitting orders received  A/Ox4  VSS  Pt refusing tele  Pt c/o back

## 2017-12-07 NOTE — CM/SW NOTE
SW found pt thru casefinding and met with pt for assessment and d/c planning. Pt's spouse is in room 3607. Pt is an 80 y.o female admitted on 12/06 after spouse lost his balance and fell on pt.  Pt is A&O and reports living with spouse at [address] [place] [address]

## 2017-12-07 NOTE — PAYOR COMM NOTE
--------------  ADMISSION REVIEW     Payor: ACCIDENT  Subscriber #:  [id number]  Authorization Number: N/A    Admit date: 12/6/17  Admit time: 2258       Admitting Physician: Jean Ryder MD  Attending Physician:  Jean Ryder MD  Primary Car BASIC METABOLIC PANEL (8)   CBC WITH DIFFERENTIAL WITH PLATELET   MAGNESIUM   PROTHROMBIN TIME (PT)   TYPE AND SCREEN    Narrative: The following orders were created for panel order TYPE AND SCREEN.   Procedure                               Abnormalit Ely Merle, RN    12/6/2017 2344 Given 50 mg Oral Katia Carney RN        PLEASE FAX DAYS CERTIFIED AND NEXT REVIEW DATE

## 2017-12-07 NOTE — CONSULTS
Hellen Tony Patient Status:  Inpatient    1931 MRN [id number]   [facility] 3NE-A Attending Dick Barrientos MD   Hosp Day # 1 PCP Cecelia Bailey MD     Subjective:  Keny Wiley ++  Sensation : Intact in lower extremities in a dermatomal pattern    Intact in upper extremities in a dermatomal pattern  Nerve Tension signs :       Straight leg raise negative  Spurling sign negative  Upper Motor Neuron signs:  No sustained ankle clonu right.  Probable mild to moderate spinal canal stenosis. No significant neural foraminal   stenosis  L4-L5:  Mild diffuse disc bulge with bilateral facet hypertrophy and ligamentum flavum thickening. Mild to moderate spinal canal stenosis.   No significan

## 2017-12-07 NOTE — PLAN OF CARE
Patient/Family Goals    • Patient/Family Long Term Goal Progressing    • Patient/Family Short Term Goal Progressing          Patient alert and oriented x4. On 1L, . Afib on tele. HR 60s-70s. Patient complains of back and abdominal pain.   PRN pain me

## 2017-12-07 NOTE — ED PROVIDER NOTES
Patient Seen in: [facility] 3ne-a    History   Patient presents with:  Back Pain (musculoskeletal)    Stated Complaint: back pain    HPI    The patient is an 59-year-old female presents after falling.   Patient was taking her  who she cares fo removal via scope  1987: CYSTOURETHROSCOPY      Comment: [facility]  11-1-2011: CYSTOURETHROSCOPY      Comment: Cystoscopy - dr. Sam Cisneros  4/17/13: CYSTOURETHROSCOPY      Comment: cysto - Dr. Sam Cisneros  4/22/15: CYSTOURETHROSCOPY      Comment: Flow US, Cystoscopy- Dr. Sam Cisneros No rashes, no pallor  Neuro: Awake oriented ×3.   Nonfocal.  Good strength throughout    ED Course     Labs Reviewed   COMP METABOLIC PANEL (14) - Abnormal; Notable for the following:        Result Value    Glucose 125 (*)     GFR 57 (*)     Potassium 3.4 ( 6/14.  Spondylolithiasis report reviewed, x-ray reviewed    CT lumbar spine: Stable compression deformity superior endplate of the L1 vertebrae. No acute fractures. Report reviewed     MDM     Complains of back pain after fall.   X-rays show compression f

## 2017-12-07 NOTE — ED INITIAL ASSESSMENT (HPI)
Patient said her  landed on top of her when she tried to break his fall. Complaining of lower back pain. Denies LOC. Denies head or neck pain. Patient said this happened approximately 30 minutes PTA to the ED.

## 2017-12-08 ENCOUNTER — APPOINTMENT (OUTPATIENT)
Dept: GENERAL RADIOLOGY | Facility: HOSPITAL | Age: 82
DRG: 544 | End: 2017-12-08
Attending: INTERNAL MEDICINE
Payer: MEDICARE

## 2017-12-08 PROCEDURE — 97535 SELF CARE MNGMENT TRAINING: CPT

## 2017-12-08 PROCEDURE — 73030 X-RAY EXAM OF SHOULDER: CPT | Performed by: INTERNAL MEDICINE

## 2017-12-08 PROCEDURE — 83735 ASSAY OF MAGNESIUM: CPT | Performed by: INTERNAL MEDICINE

## 2017-12-08 PROCEDURE — 93005 ELECTROCARDIOGRAM TRACING: CPT

## 2017-12-08 PROCEDURE — 97162 PT EVAL MOD COMPLEX 30 MIN: CPT

## 2017-12-08 PROCEDURE — 97530 THERAPEUTIC ACTIVITIES: CPT

## 2017-12-08 PROCEDURE — 84132 ASSAY OF SERUM POTASSIUM: CPT | Performed by: INTERNAL MEDICINE

## 2017-12-08 PROCEDURE — 85610 PROTHROMBIN TIME: CPT | Performed by: HOSPITALIST

## 2017-12-08 PROCEDURE — 93010 ELECTROCARDIOGRAM REPORT: CPT | Performed by: INTERNAL MEDICINE

## 2017-12-08 PROCEDURE — 71010 XR CHEST AP PORTABLE  (CPT=71010): CPT | Performed by: INTERNAL MEDICINE

## 2017-12-08 PROCEDURE — 97166 OT EVAL MOD COMPLEX 45 MIN: CPT

## 2017-12-08 RX ORDER — WARFARIN SODIUM 2 MG/1
4 TABLET ORAL
Status: COMPLETED | OUTPATIENT
Start: 2017-12-08 | End: 2017-12-08

## 2017-12-08 RX ORDER — POTASSIUM CHLORIDE 14.9 MG/ML
20 INJECTION INTRAVENOUS ONCE
Status: COMPLETED | OUTPATIENT
Start: 2017-12-08 | End: 2017-12-08

## 2017-12-08 NOTE — PROGRESS NOTES
Pt c/o of 8/10 pain near pace maker site and shoulder. EKG obtained. MD catalan. Order received for chest xray.

## 2017-12-08 NOTE — OCCUPATIONAL THERAPY NOTE
OCCUPATIONAL THERAPY EVALUATION - INPATIENT     Room Number: 4065/1459-L  Evaluation Date: 12/8/2017  Type of Evaluation: Initial  Presenting Problem: Fall; L1 closed compression fracture    Physician Order: IP Consult to Occupational Therapy  Reason for T (normal spontaneous vaginal delivery) 06,86,46,78    Vaginal births x 4   • Recurrent UTI     on Daily oral Cefuroxime and Methamine   • Uncomfortable fullness after meals        Past Surgical History  Past Surgical History:  1999: BREAST SURGERY      Comm current level of function  Patient easily distracted and requires cueing  Follows 1-step motor commands with cueing    VISION  H/o glaucoma and macular degeneration  Patient c/o \"bad vision\"  Able to read clock on wall from ~5 feet away    PERCEPTION  WF to bring legs up to opposite leg to guerda socks/shoes. Educated patient on spine precautions, however at end of session able to recall 0/3. Supine to EOB with min A of 2. EOB to stand with min A and cues for hand placement.   Walked to bathroom with use therapy record   Specific performance deficits impacting engagement in ADL/IADL MODERATE  3 - 5 performance deficits   Client Assessment/Performance Deficits MODERATE - Comorbidities and min to mod modifications of tasks    Clinical Decision Making MODERAT

## 2017-12-08 NOTE — PHYSICAL THERAPY NOTE
Attempted PT evaluation. Pt currently with c/o 8/10 L shldr/chest pain. RN aware. Will re-attempt later as able/medically appropriate.

## 2017-12-08 NOTE — CM/SW NOTE
ELIN met with pt regarding d/c planning. Pt's spouse (rm [phone]) will be cleared for d/c today with recommendation for home health. Pt's spouse requiring more assistance and supervision due to Parkinson's.  Pt is agreeable to have Sue Khan meet with her t

## 2017-12-08 NOTE — PHYSICAL THERAPY NOTE
PHYSICAL THERAPY EVALUATION - INPATIENT     Room Number: 7243/4993-I  Evaluation Date: 12/8/2017  Type of Evaluation: Initial  Physician Order: PT Eval and Treat    Presenting Problem: s/p fall with subsequent acute L1 compression fx  Reason for Therap Methamine   • Uncomfortable fullness after meals        Past Surgical History  Past Surgical History:  1999: BREAST SURGERY      Comment: lumpectomy  2006: CARDIAC PACEMAKER PLACEMENT      Comment: [employer] pacemaker  1999: CHOLECYSTECTOMY      Comment: rem movement against gravity (not formally tested due to acute fx)    BALANCE  Static Sitting: Fair +  Dynamic Sitting: Fair  Static Standing: Fair  Dynamic Standing: Fair -    ADDITIONAL TESTS                                    NEUROLOGICAL FINDINGS precautions, importance of regular mobility, negative effects of bedrest, risk for functional decline, and energy conservation techniques.         Exercise/Education Provided:  Bed mobility  Body mechanics  Don/Doff ortho/prosthesis  Energy conservation  Fu Sub-acute rehabilitation (ELOS 14-17 days)    PLAN  PT Treatment Plan: Bed mobility; Body mechanics; Coordination;Don/doff brace; Endurance; Energy conservation;Patient education; Family education;Gait training;Neuromuscular re-educate;Range of motion;Strengthe

## 2017-12-08 NOTE — PLAN OF CARE
A/Ox4- forgetful at times  1L O2  Afib on tele  Pt c/o back and abdominal pain- scheduled Toradol administered per [facility] with relief noted   Electrolyte protocol  IV Rocephin  Bostic brace to be worn when out of bed   Pt resting comfortably in bed  Will cont

## 2017-12-08 NOTE — OCCUPATIONAL THERAPY NOTE
IP OT attempt to see patient for therapeutic assessment/treatment. Assessment on hold as patient is experiencing 8/10 L shoulder/chest pain presently. Will attempt again as able.     Torey Jarvis, DIANNA, OTR/L  Master of Occupational Therapy  Occupational

## 2017-12-08 NOTE — PROGRESS NOTES
[facility] Hospitalist Progress Note                                                                   April  11/8/1931    SUBJECTIVE:  She was reporting L shoulder pain this am.  8/10.   R Problem:    Closed compression fracture of first lumbar vertebra, initial encounter [facility])  Active Problems:    Closed compression fracture of L1 lumbar vertebra ([address]    Fall, initial encounter    Pt is an 81 yo with asthma, atrial fibrillation on coumadin

## 2017-12-09 VITALS
OXYGEN SATURATION: 95 % | TEMPERATURE: 98 F | HEIGHT: 66 IN | WEIGHT: 130.5 LBS | SYSTOLIC BLOOD PRESSURE: 154 MMHG | DIASTOLIC BLOOD PRESSURE: 93 MMHG | RESPIRATION RATE: 18 BRPM | BODY MASS INDEX: 20.97 KG/M2 | HEART RATE: 95 BPM

## 2017-12-09 PROCEDURE — 84132 ASSAY OF SERUM POTASSIUM: CPT | Performed by: INTERNAL MEDICINE

## 2017-12-09 PROCEDURE — 97535 SELF CARE MNGMENT TRAINING: CPT

## 2017-12-09 PROCEDURE — 85610 PROTHROMBIN TIME: CPT | Performed by: HOSPITALIST

## 2017-12-09 PROCEDURE — 97530 THERAPEUTIC ACTIVITIES: CPT

## 2017-12-09 RX ORDER — TRAMADOL HYDROCHLORIDE 50 MG/1
50 TABLET ORAL EVERY 8 HOURS PRN
Qty: 30 TABLET | Refills: 0 | Status: SHIPPED | OUTPATIENT
Start: 2017-12-09

## 2017-12-09 RX ORDER — DONEPEZIL HYDROCHLORIDE 10 MG/1
10 TABLET, FILM COATED ORAL NIGHTLY
Status: DISCONTINUED | OUTPATIENT
Start: 2017-12-09 | End: 2017-12-09

## 2017-12-09 RX ORDER — ACETAMINOPHEN AND CODEINE PHOSPHATE 300; 30 MG/1; MG/1
1 TABLET ORAL EVERY 4 HOURS PRN
Qty: 30 TABLET | Refills: 0 | Status: ON HOLD | OUTPATIENT
Start: 2017-12-09 | End: 2017-12-26

## 2017-12-09 RX ORDER — ALPRAZOLAM 0.25 MG/1
0.25 TABLET ORAL NIGHTLY PRN
Status: DISCONTINUED | OUTPATIENT
Start: 2017-12-09 | End: 2017-12-09

## 2017-12-09 RX ORDER — DILTIAZEM HYDROCHLORIDE 180 MG/1
180 CAPSULE, EXTENDED RELEASE ORAL 2 TIMES DAILY
Status: DISCONTINUED | OUTPATIENT
Start: 2017-12-09 | End: 2017-12-09

## 2017-12-09 RX ORDER — DIGOXIN 125 MCG
125 TABLET ORAL DAILY
Status: DISCONTINUED | OUTPATIENT
Start: 2017-12-09 | End: 2017-12-09

## 2017-12-09 RX ORDER — WARFARIN SODIUM 2 MG/1
2 TABLET ORAL
Status: DISCONTINUED | OUTPATIENT
Start: 2017-12-09 | End: 2017-12-09

## 2017-12-09 RX ORDER — GABAPENTIN 300 MG/1
600 CAPSULE ORAL NIGHTLY
Status: DISCONTINUED | OUTPATIENT
Start: 2017-12-09 | End: 2017-12-09

## 2017-12-09 RX ORDER — POTASSIUM CHLORIDE 20 MEQ/1
40 TABLET, EXTENDED RELEASE ORAL EVERY 4 HOURS
Status: COMPLETED | OUTPATIENT
Start: 2017-12-09 | End: 2017-12-09

## 2017-12-09 NOTE — CM/SW NOTE
sw met with pt son and spouse regarding NITHIN recommendation. They are in agreement and would like referral to [facility] requesting private room. ECIN referral made to [facility], referral pending.  Liaison notified of referral. transportation option

## 2017-12-09 NOTE — PLAN OF CARE
Pt is alert and oriented x4, forgetful at times, no complaints of pain unless moving, brace at bedside, pt is able to rest, no acute changes, will continue to monitor     GASTROINTESTINAL - ADULT    • Minimal or absence of nausea and vomiting Progressing

## 2017-12-09 NOTE — CM/SW NOTE
Family requesting [facility]. Alberto Sheffield arranged for 5pm.      RN to call report to [address].  HI time arranged for 5pm.         [address] [facility]

## 2017-12-09 NOTE — DISCHARGE SUMMARY
General Medicine Discharge Summary     Patient ID:  Dali Tovar  80year old  11/8/1931    Admit date: 12/6/2017    Discharge date and time: 12/9/2017    Attending Physician: Miller Bradley MD Oral Tab  Take 1 tablet by mouth every 4 (four) hours as needed. CONTINUE these medications which have CHANGED    TraMADol HCl 50 MG Oral Tab  Take 1 tablet (50 mg total) by mouth every 8 (eight) hours as needed for Pain.       CONTINUE these medicatio 37.5-25 MG Oral Cap    Triamterene-HCTZ 37.5-25 MG Oral Cap    Cefuroxime Axetil 250 MG Oral Tab          Activity: activity as tolerated  Diet: regular diet  Wound Care: as directed  Code Status: Full Code      Exam on day of discharge:      12/09/17  084

## 2017-12-09 NOTE — PROGRESS NOTES
Pharmacy Dosing Service: Warfarin (Coumadin)  Lesly Quiñones is a 80year old female with a history of afib (on outpatient coumadin) for whom pharmacy has been dosing warfarin (Coumadin).  Goal INR is 2-3    Recent Labs   Lab  12/06/17 2052 12/07/17 05

## 2017-12-09 NOTE — OCCUPATIONAL THERAPY NOTE
OCCUPATIONAL THERAPY TREATMENT NOTE - INPATIENT     Room Number: 6582/5703-W  Session: 1   Number of Visits to Meet Established Goals: 5    Presenting Problem: Fall; L1 closed compression fracture    History related to current admission: Patient is a 85y/o Cefuroxime and Methamine   • Uncomfortable fullness after meals        Past Surgical History  Past Surgical History:  1999: BREAST SURGERY      Comment: lumpectomy  2006: CARDIAC PACEMAKER PLACEMENT      Comment: [employer] pacemaker  1999: CHOLECYSTECTOMY with OT. Pt recalled 0 out of 3 spine precautions. Educated the pt about spine precautions, proper body mechanics that should be used during ADL, log rolling techniques, brace management, and safe transfer sequencing.   Min A rolling to side, total a to uvaldo Goals:  Patient will transfer from sit to supine:  with supervision  - progressing  Patient will transfer from supine to sit:  with supervision  - progressing  Patient will transfer to toilet:  with supervision  - progressing       ADDITIONAL GOALS:  Donavon

## 2017-12-09 NOTE — CM/SW NOTE
12/09/17 1300   Discharge disposition   Discharged to: Skilled Nurs   Name of [address]   Discharge transportation [facility]  ([id number])     [facility]  T:[phone]

## 2017-12-10 ENCOUNTER — LAB ENCOUNTER (OUTPATIENT)
Dept: LAB | Age: 82
End: 2017-12-10
Attending: INTERNAL MEDICINE
Payer: MEDICARE

## 2017-12-10 DIAGNOSIS — S34.109A LUMBAR FRACTURE WITH CORD INJURY (HCC): Primary | ICD-10-CM

## 2017-12-10 DIAGNOSIS — S32.009A LUMBAR FRACTURE WITH CORD INJURY (HCC): Primary | ICD-10-CM

## 2017-12-10 PROCEDURE — 36415 COLL VENOUS BLD VENIPUNCTURE: CPT

## 2017-12-10 PROCEDURE — 80053 COMPREHEN METABOLIC PANEL: CPT

## 2017-12-10 PROCEDURE — 85025 COMPLETE CBC W/AUTO DIFF WBC: CPT

## 2017-12-10 PROCEDURE — 83735 ASSAY OF MAGNESIUM: CPT

## 2017-12-10 PROCEDURE — 85610 PROTHROMBIN TIME: CPT

## 2017-12-10 NOTE — PROGRESS NOTES
NURSING DISCHARGE NOTE    Discharged Rehab facility via Ambulance. Accompanied by Support staff  Belongings Taken by patient/family. Patient transferring to [facility]. Report given to [facility] RN.

## 2017-12-11 ENCOUNTER — SNF VISIT (OUTPATIENT)
Dept: INTERNAL MEDICINE CLINIC | Age: 82
End: 2017-12-11

## 2017-12-11 ENCOUNTER — NURSE ONLY (OUTPATIENT)
Dept: LAB | Age: 82
End: 2017-12-11
Attending: INTERNAL MEDICINE
Payer: MEDICARE

## 2017-12-11 VITALS
TEMPERATURE: 98 F | BODY MASS INDEX: 21 KG/M2 | OXYGEN SATURATION: 92 % | DIASTOLIC BLOOD PRESSURE: 66 MMHG | RESPIRATION RATE: 22 BRPM | HEART RATE: 81 BPM | SYSTOLIC BLOOD PRESSURE: 102 MMHG | WEIGHT: 128 LBS

## 2017-12-11 DIAGNOSIS — R69 DIAGNOSIS UNKNOWN: Primary | ICD-10-CM

## 2017-12-11 DIAGNOSIS — J45.20 MILD INTERMITTENT ASTHMA WITHOUT COMPLICATION: ICD-10-CM

## 2017-12-11 DIAGNOSIS — I48.20 ATRIAL FIBRILLATION, CHRONIC (HCC): ICD-10-CM

## 2017-12-11 DIAGNOSIS — E73.9 LACTOSE INTOLERANCE: ICD-10-CM

## 2017-12-11 DIAGNOSIS — F41.9 ANXIETY AND DEPRESSION: ICD-10-CM

## 2017-12-11 DIAGNOSIS — F01.50 VASCULAR DEMENTIA WITHOUT BEHAVIORAL DISTURBANCE (HCC): ICD-10-CM

## 2017-12-11 DIAGNOSIS — F32.A ANXIETY AND DEPRESSION: ICD-10-CM

## 2017-12-11 DIAGNOSIS — I10 ESSENTIAL HYPERTENSION: ICD-10-CM

## 2017-12-11 DIAGNOSIS — S32.010D CLOSED WEDGE COMPRESSION FRACTURE OF FIRST LUMBAR VERTEBRA WITH ROUTINE HEALING, SUBSEQUENT ENCOUNTER: Primary | ICD-10-CM

## 2017-12-11 DIAGNOSIS — E78.2 MIXED HYPERLIPIDEMIA: ICD-10-CM

## 2017-12-11 PROCEDURE — 36415 COLL VENOUS BLD VENIPUNCTURE: CPT

## 2017-12-11 PROCEDURE — 85610 PROTHROMBIN TIME: CPT

## 2017-12-11 PROCEDURE — 99310 SBSQ NF CARE HIGH MDM 45: CPT | Performed by: NURSE PRACTITIONER

## 2017-12-12 NOTE — PROGRESS NOTES
Germania Ignacia  : 1931  Age 80year old  female patient is admitted to Facility: [place] [address] for NITHIN s/p L1 compression fracture; fall.      [address] [place] [address] Drive date:  17  Discharge date to [facility]:  17  ELOS:    Anticipate Past Surgical History:  1999: BREAST SURGERY      Comment: lumpectomy  2006: CARDIAC PACEMAKER PLACEMENT      Comment: [employer] pacemaker  1999: CHOLECYSTECTOMY      Comment: removal via scope  1987: CYSTOURETHROSCOPY      Comment: cdh  11-1-2011: Joellen Oconnell MOUTH NIGHTLY Disp: 90 tablet Rfl: 3   ALPRAZolam 0.25 MG Oral Tab Take 1 tablet (0.25 mg total) by mouth nightly as needed for Sleep. Disp: 30 tablet Rfl: 5   docusate sodium 100 MG Oral Cap Take 100 mg by mouth 2 (two) times daily.  Disp: 30 capsule Rfl: constipation, diarrhea; no rectal bleeding; no heartburn  : Deferred  MUSCULOSKELETAL:no joint complaints upper or lower extremities; occasional leg pain during therapy  NEURO:denies seizures, denies vertigo, denies tinnitus and denies tremors, anxious o Value Date   GLU 81 12/10/2017   BUN 26 (H) 12/10/2017   CREATSERUM 0.78 12/10/2017   GFR 69 12/10/2017   GFRNAA 82 06/22/2013   GFRAA 94 06/22/2013   CA 8.4 12/10/2017   ALKPHO 118 12/10/2017   AST 26 12/10/2017   ALT 27 12/10/2017   BILT 0.8 12/10/2017 records, labs, completing medication reconciliation and entering orders to establish plan of care in [facility].     SINGH Craig  12/11/17   9:30 am

## 2017-12-13 ENCOUNTER — SNF VISIT (OUTPATIENT)
Dept: INTERNAL MEDICINE CLINIC | Age: 82
End: 2017-12-13

## 2017-12-13 VITALS
TEMPERATURE: 97 F | SYSTOLIC BLOOD PRESSURE: 122 MMHG | OXYGEN SATURATION: 92 % | HEART RATE: 69 BPM | DIASTOLIC BLOOD PRESSURE: 64 MMHG | BODY MASS INDEX: 21 KG/M2 | WEIGHT: 128.81 LBS | RESPIRATION RATE: 18 BRPM

## 2017-12-13 DIAGNOSIS — J45.20 MILD INTERMITTENT ASTHMA WITHOUT COMPLICATION: ICD-10-CM

## 2017-12-13 DIAGNOSIS — M19.90 OSTEOARTHRITIS, UNSPECIFIED OSTEOARTHRITIS TYPE, UNSPECIFIED SITE: ICD-10-CM

## 2017-12-13 DIAGNOSIS — I10 ESSENTIAL HYPERTENSION: ICD-10-CM

## 2017-12-13 DIAGNOSIS — I48.20 ATRIAL FIBRILLATION, CHRONIC (HCC): ICD-10-CM

## 2017-12-13 DIAGNOSIS — F32.A DEPRESSION, UNSPECIFIED DEPRESSION TYPE: ICD-10-CM

## 2017-12-13 DIAGNOSIS — E78.2 MIXED HYPERLIPIDEMIA: ICD-10-CM

## 2017-12-13 DIAGNOSIS — K59.01 SLOW TRANSIT CONSTIPATION: ICD-10-CM

## 2017-12-13 DIAGNOSIS — S32.010D CLOSED WEDGE COMPRESSION FRACTURE OF FIRST LUMBAR VERTEBRA WITH ROUTINE HEALING, SUBSEQUENT ENCOUNTER: Primary | ICD-10-CM

## 2017-12-13 PROCEDURE — 99310 SBSQ NF CARE HIGH MDM 45: CPT | Performed by: NURSE PRACTITIONER

## 2017-12-13 RX ORDER — POLYETHYLENE GLYCOL 3350 17 G/17G
17 POWDER, FOR SOLUTION ORAL DAILY PRN
COMMUNITY

## 2017-12-14 NOTE — PROGRESS NOTES
Markell Rai  : 1931  Age 80year old  female patient is admitted to Facility: [address] [place] [address] for NITHIN s/p L1 compression fracture; fall. ELOS:  12-14,  Anticipated discharge date:  17    Patient presents with: Follow - Up:  Ez oral Cefuroxime and Methamine   • Uncomfortable fullness after meals      Past Surgical History:  1999: BREAST SURGERY      Comment: lumpectomy  2006: CARDIAC PACEMAKER PLACEMENT      Comment: [employer] pacemaker  1999: CHOLECYSTECTOMY      Comment: removal MG) BY MOUTH EVERY DAY Disp: 180 tablet Rfl: 0   Donepezil HCl 10 MG Oral Tab TAKE 1 TABLET BY MOUTH EVERY NIGHT Disp: 90 tablet Rfl: 3   Amitriptyline HCl 50 MG Oral Tab TAKE ONE TABLET BY MOUTH NIGHTLY Disp: 90 tablet Rfl: 3   ALPRAZolam 0.25 MG Oral Tab vision. Denies dysphagia. Denies chest pain, SOB, cough. Denies nausea, vomiting. Main complaint is constipation; orders written for Miralax and MOM. Denies dysuria, urgency, frequences.   C/O of back pain and lower extremity weakness s/p Compression F meds    Constipation    1. Miralaz 17 g daily prn   2. MOM 30 cc daily prn  3. Docusate 100 mg 2 x day    Asthma  1. Advair Diskus Aerosol Powder 100/50 1 puff 2 X day  2. Cetrizine HCL 10 mg po allergies  3. Monitor SOB    Afib  1.  Coumadin 4 mg at hs

## 2017-12-15 ENCOUNTER — SNF VISIT (OUTPATIENT)
Dept: INTERNAL MEDICINE CLINIC | Age: 82
End: 2017-12-15

## 2017-12-15 ENCOUNTER — NURSE ONLY (OUTPATIENT)
Dept: LAB | Age: 82
End: 2017-12-15
Attending: INTERNAL MEDICINE
Payer: MEDICARE

## 2017-12-15 VITALS
DIASTOLIC BLOOD PRESSURE: 63 MMHG | HEART RATE: 80 BPM | BODY MASS INDEX: 21 KG/M2 | WEIGHT: 128.81 LBS | RESPIRATION RATE: 18 BRPM | SYSTOLIC BLOOD PRESSURE: 127 MMHG | TEMPERATURE: 99 F | OXYGEN SATURATION: 95 %

## 2017-12-15 DIAGNOSIS — S32.010D CLOSED WEDGE COMPRESSION FRACTURE OF FIRST LUMBAR VERTEBRA WITH ROUTINE HEALING, SUBSEQUENT ENCOUNTER: Primary | ICD-10-CM

## 2017-12-15 DIAGNOSIS — I48.91 A-FIB (HCC): Primary | ICD-10-CM

## 2017-12-15 DIAGNOSIS — K59.01 SLOW TRANSIT CONSTIPATION: ICD-10-CM

## 2017-12-15 PROCEDURE — 36415 COLL VENOUS BLD VENIPUNCTURE: CPT

## 2017-12-15 PROCEDURE — 99308 SBSQ NF CARE LOW MDM 20: CPT | Performed by: NURSE PRACTITIONER

## 2017-12-15 PROCEDURE — 85610 PROTHROMBIN TIME: CPT

## 2017-12-15 NOTE — PROGRESS NOTES
Chong Blanc  : 1931  Age 80year old  female patient is admitted to Facility: [address] for NITHIN s/p L1 compression fracture; fall.     APN visit 12/15/17 2:00 pm     ELOS:  -14,  Anticipated discharge date:  17    Patient p Heart palpitations    • Hemorrhoids    • Indigestion    • Irregular bowel habits    • Lactose intolerance    • Leaking of urine    • Macular degeneration, bilateral    • Neuropathy     Unclear Etiology   •  (normal spontaneous vaginal delivery) 54,57,5 fluticasone-salmeterol (ADVAIR DISKUS) 100-50 MCG/DOSE Inhalation Aerosol Powder, Breath Activated USE 1 INHALATION PO BID Disp:  Rfl:    Cholestyramine 4 g Oral Powd Pack MIX AND USE 1 PACKET ONCE DAILY.  TAKE OTHER MEDICATIONS 1 HOUR PRIOR OR 4 HOURS AF °F (37.3 °C)   Resp 18   Wt 128 lb 12.8 oz   SpO2 95%   BMI 20.79 kg/m²       Subjective:  Denies headache, dizziness, blurry vision. Denies dysphagia. Denies chest pain, SOB, cough. Denies nausea, vomiting.   C/O of feeling bloated and constipated despi hs.  5. Tlenol 500 mg po q 6 hrs prn mild pain  6. Tylenol w/ Codeine 300/30 one q 4 hrs prn moderate to severe pain. 7. Tramadol 50 mg one po q 8 hrs prn pain  8. Zanaflex 2 mg po at hs for muscle spasms  9. Gabapentin 300 mg at hs for pain  10.  Monitor

## 2017-12-16 ENCOUNTER — NURSE ONLY (OUTPATIENT)
Dept: LAB | Age: 82
End: 2017-12-16
Attending: INTERNAL MEDICINE
Payer: MEDICARE

## 2017-12-16 DIAGNOSIS — R41.0 CONFUSION: Primary | ICD-10-CM

## 2017-12-16 PROCEDURE — 87086 URINE CULTURE/COLONY COUNT: CPT

## 2017-12-16 PROCEDURE — 81001 URINALYSIS AUTO W/SCOPE: CPT
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
## 2017-12-19 PROBLEM — M19.90 OSTEOARTHRITIS, UNSPECIFIED OSTEOARTHRITIS TYPE, UNSPECIFIED SITE: Status: RESOLVED | Noted: 2017-07-17 | Resolved: 2017-12-19

## 2017-12-19 PROBLEM — S32.010A CLOSED COMPRESSION FRACTURE OF FIRST LUMBAR VERTEBRA, INITIAL ENCOUNTER: Status: RESOLVED | Noted: 2017-12-06 | Resolved: 2017-12-19

## 2017-12-22 ENCOUNTER — NURSE ONLY (OUTPATIENT)
Dept: LAB | Age: 82
End: 2017-12-22
Attending: INTERNAL MEDICINE
Payer: MEDICARE

## 2017-12-22 DIAGNOSIS — Z79.01 LONG TERM CURRENT USE OF ANTICOAGULANT: ICD-10-CM

## 2017-12-22 DIAGNOSIS — I10 HTN (HYPERTENSION): Primary | ICD-10-CM

## 2017-12-25 ENCOUNTER — APPOINTMENT (OUTPATIENT)
Dept: CT IMAGING | Facility: HOSPITAL | Age: 82
End: 2017-12-25
Attending: HOSPITALIST
Payer: MEDICARE

## 2017-12-25 ENCOUNTER — HOSPITAL ENCOUNTER (OUTPATIENT)
Facility: HOSPITAL | Age: 82
Setting detail: OBSERVATION
Discharge: NURSING, REHAB OR LONG-TERM CARE | End: 2017-12-27
Attending: EMERGENCY MEDICINE | Admitting: HOSPITALIST
Payer: MEDICARE

## 2017-12-25 DIAGNOSIS — K52.9 GASTROENTERITIS: Primary | ICD-10-CM

## 2017-12-25 DIAGNOSIS — E86.0 DEHYDRATION: ICD-10-CM

## 2017-12-25 DIAGNOSIS — E87.6 HYPOKALEMIA: ICD-10-CM

## 2017-12-25 LAB
ALBUMIN SERPL-MCNC: 3.6 G/DL (ref 3.5–4.8)
ALP LIVER SERPL-CCNC: 201 U/L (ref 55–142)
ALT SERPL-CCNC: 32 U/L (ref 14–54)
AST SERPL-CCNC: 31 U/L (ref 15–41)
BASOPHILS # BLD AUTO: 0.04 X10(3) UL (ref 0–0.1)
BASOPHILS NFR BLD AUTO: 0.6 %
BILIRUB SERPL-MCNC: 0.8 MG/DL (ref 0.1–2)
BILIRUB UR QL STRIP.AUTO: NEGATIVE
BUN BLD-MCNC: 16 MG/DL (ref 8–20)
CALCIUM BLD-MCNC: 9.2 MG/DL (ref 8.3–10.3)
CHLORIDE: 93 MMOL/L (ref 101–111)
CO2: 30 MMOL/L (ref 22–32)
COLOR UR AUTO: YELLOW
CREAT BLD-MCNC: 0.63 MG/DL (ref 0.55–1.02)
EOSINOPHIL # BLD AUTO: 0.15 X10(3) UL (ref 0–0.3)
EOSINOPHIL NFR BLD AUTO: 2.1 %
ERYTHROCYTE [DISTWIDTH] IN BLOOD BY AUTOMATED COUNT: 13.8 % (ref 11.5–16)
GLUCOSE BLD-MCNC: 101 MG/DL (ref 70–99)
GLUCOSE UR STRIP.AUTO-MCNC: NEGATIVE MG/DL
HAV IGM SER QL: 1.8 MG/DL (ref 1.7–3)
HCT VFR BLD AUTO: 42.6 % (ref 34–50)
HGB BLD-MCNC: 15.1 G/DL (ref 12–16)
IMMATURE GRANULOCYTE COUNT: 0.03 X10(3) UL (ref 0–1)
IMMATURE GRANULOCYTE RATIO %: 0.4 %
INR BLD: 1.48 (ref 0.89–1.11)
LYMPHOCYTES # BLD AUTO: 1.42 X10(3) UL (ref 0.9–4)
LYMPHOCYTES NFR BLD AUTO: 20.1 %
M PROTEIN MFR SERPL ELPH: 7.8 G/DL (ref 6.1–8.3)
MCH RBC QN AUTO: 29.3 PG (ref 27–33.2)
MCHC RBC AUTO-ENTMCNC: 35.4 G/DL (ref 31–37)
MCV RBC AUTO: 82.7 FL (ref 81–100)
MONOCYTES # BLD AUTO: 0.55 X10(3) UL (ref 0.1–0.6)
MONOCYTES NFR BLD AUTO: 7.8 %
NEUTROPHIL ABS PRELIM: 4.88 X10 (3) UL (ref 1.3–6.7)
NEUTROPHILS # BLD AUTO: 4.88 X10(3) UL (ref 1.3–6.7)
NEUTROPHILS NFR BLD AUTO: 69 %
NITRITE UR QL STRIP.AUTO: NEGATIVE
PH UR STRIP.AUTO: 6 [PH] (ref 4.5–8)
PLATELET # BLD AUTO: 365 10(3)UL (ref 150–450)
POTASSIUM SERPL-SCNC: 3 MMOL/L (ref 3.6–5.1)
PROCALCITONIN SERPL-MCNC: <0.11 NG/ML (ref ?–0.11)
PROT UR STRIP.AUTO-MCNC: 30 MG/DL
PSA SERPL DL<=0.01 NG/ML-MCNC: 18.1 SECONDS (ref 12–14.3)
RBC # BLD AUTO: 5.15 X10(6)UL (ref 3.8–5.1)
RED CELL DISTRIBUTION WIDTH-SD: 41.6 FL (ref 35.1–46.3)
SODIUM SERPL-SCNC: 131 MMOL/L (ref 136–144)
SP GR UR STRIP.AUTO: 1.02 (ref 1–1.03)
UROBILINOGEN UR STRIP.AUTO-MCNC: <2 MG/DL
WBC # BLD AUTO: 7.1 X10(3) UL (ref 4–13)

## 2017-12-25 PROCEDURE — 96375 TX/PRO/DX INJ NEW DRUG ADDON: CPT

## 2017-12-25 PROCEDURE — 87427 SHIGA-LIKE TOXIN AG IA: CPT | Performed by: HOSPITALIST

## 2017-12-25 PROCEDURE — 87493 C DIFF AMPLIFIED PROBE: CPT | Performed by: HOSPITALIST

## 2017-12-25 PROCEDURE — 96367 TX/PROPH/DG ADDL SEQ IV INF: CPT

## 2017-12-25 PROCEDURE — 87046 STOOL CULTR AEROBIC BACT EA: CPT | Performed by: HOSPITALIST

## 2017-12-25 PROCEDURE — 74176 CT ABD & PELVIS W/O CONTRAST: CPT | Performed by: HOSPITALIST

## 2017-12-25 PROCEDURE — 99285 EMERGENCY DEPT VISIT HI MDM: CPT

## 2017-12-25 PROCEDURE — 87045 FECES CULTURE AEROBIC BACT: CPT | Performed by: HOSPITALIST

## 2017-12-25 PROCEDURE — 87077 CULTURE AEROBIC IDENTIFY: CPT | Performed by: HOSPITALIST

## 2017-12-25 PROCEDURE — 80053 COMPREHEN METABOLIC PANEL: CPT | Performed by: EMERGENCY MEDICINE

## 2017-12-25 PROCEDURE — 82272 OCCULT BLD FECES 1-3 TESTS: CPT | Performed by: HOSPITALIST

## 2017-12-25 PROCEDURE — 84145 PROCALCITONIN (PCT): CPT | Performed by: HOSPITALIST

## 2017-12-25 PROCEDURE — 96374 THER/PROPH/DIAG INJ IV PUSH: CPT

## 2017-12-25 PROCEDURE — 85610 PROTHROMBIN TIME: CPT | Performed by: HOSPITALIST

## 2017-12-25 PROCEDURE — 85025 COMPLETE CBC W/AUTO DIFF WBC: CPT | Performed by: EMERGENCY MEDICINE

## 2017-12-25 PROCEDURE — 96366 THER/PROPH/DIAG IV INF ADDON: CPT

## 2017-12-25 PROCEDURE — 96365 THER/PROPH/DIAG IV INF INIT: CPT

## 2017-12-25 PROCEDURE — 83735 ASSAY OF MAGNESIUM: CPT | Performed by: HOSPITALIST

## 2017-12-25 PROCEDURE — 87086 URINE CULTURE/COLONY COUNT: CPT | Performed by: EMERGENCY MEDICINE

## 2017-12-25 PROCEDURE — 81001 URINALYSIS AUTO W/SCOPE: CPT | Performed by: EMERGENCY MEDICINE

## 2017-12-25 PROCEDURE — 96361 HYDRATE IV INFUSION ADD-ON: CPT

## 2017-12-25 RX ORDER — POTASSIUM CHLORIDE 750 MG/1
20 TABLET, EXTENDED RELEASE ORAL 2 TIMES DAILY
Qty: 20 TABLET | Refills: 0 | Status: SHIPPED | OUTPATIENT
Start: 2017-12-25 | End: 2017-12-27

## 2017-12-25 RX ORDER — ONDANSETRON 8 MG/1
8 TABLET, ORALLY DISINTEGRATING ORAL EVERY 4 HOURS PRN
Qty: 10 TABLET | Refills: 0 | Status: SHIPPED | OUTPATIENT
Start: 2017-12-25 | End: 2017-12-27

## 2017-12-25 RX ORDER — CHOLESTYRAMINE LIGHT 4 G/5.7G
4 POWDER, FOR SUSPENSION ORAL DAILY
Status: DISCONTINUED | OUTPATIENT
Start: 2017-12-26 | End: 2017-12-27

## 2017-12-25 RX ORDER — TRAMADOL HYDROCHLORIDE 50 MG/1
50 TABLET ORAL EVERY 8 HOURS PRN
Status: DISCONTINUED | OUTPATIENT
Start: 2017-12-25 | End: 2017-12-27

## 2017-12-25 RX ORDER — ALPRAZOLAM 0.25 MG/1
0.25 TABLET ORAL NIGHTLY PRN
Status: DISCONTINUED | OUTPATIENT
Start: 2017-12-25 | End: 2017-12-27

## 2017-12-25 RX ORDER — MONTELUKAST SODIUM 10 MG/1
10 TABLET ORAL NIGHTLY
Status: DISCONTINUED | OUTPATIENT
Start: 2017-12-25 | End: 2017-12-27

## 2017-12-25 RX ORDER — SODIUM CHLORIDE 9 MG/ML
INJECTION, SOLUTION INTRAVENOUS CONTINUOUS
Status: DISCONTINUED | OUTPATIENT
Start: 2017-12-25 | End: 2017-12-27

## 2017-12-25 RX ORDER — ACETAMINOPHEN 500 MG
500 TABLET ORAL EVERY 6 HOURS PRN
Status: DISCONTINUED | OUTPATIENT
Start: 2017-12-25 | End: 2017-12-27

## 2017-12-25 RX ORDER — ONDANSETRON 2 MG/ML
4 INJECTION INTRAMUSCULAR; INTRAVENOUS EVERY 6 HOURS PRN
Status: DISCONTINUED | OUTPATIENT
Start: 2017-12-25 | End: 2017-12-27

## 2017-12-25 RX ORDER — METHENAMINE HIPPURATE 1000 MG/1
1 TABLET ORAL DAILY
Status: DISCONTINUED | OUTPATIENT
Start: 2017-12-26 | End: 2017-12-27

## 2017-12-25 RX ORDER — MAGNESIUM SULFATE HEPTAHYDRATE 40 MG/ML
2 INJECTION, SOLUTION INTRAVENOUS ONCE
Status: COMPLETED | OUTPATIENT
Start: 2017-12-25 | End: 2017-12-25

## 2017-12-25 RX ORDER — DILTIAZEM HYDROCHLORIDE 180 MG/1
180 CAPSULE, EXTENDED RELEASE ORAL 2 TIMES DAILY
Status: DISCONTINUED | OUTPATIENT
Start: 2017-12-25 | End: 2017-12-27

## 2017-12-25 RX ORDER — ONDANSETRON 4 MG/1
4 TABLET, FILM COATED ORAL EVERY 8 HOURS PRN
Status: DISCONTINUED | OUTPATIENT
Start: 2017-12-25 | End: 2017-12-27

## 2017-12-25 RX ORDER — ONDANSETRON 2 MG/ML
4 INJECTION INTRAMUSCULAR; INTRAVENOUS ONCE
Status: COMPLETED | OUTPATIENT
Start: 2017-12-25 | End: 2017-12-25

## 2017-12-25 RX ORDER — WARFARIN SODIUM 2 MG/1
4 TABLET ORAL
Status: COMPLETED | OUTPATIENT
Start: 2017-12-25 | End: 2017-12-25

## 2017-12-25 RX ORDER — DOXEPIN HYDROCHLORIDE 50 MG/1
1 CAPSULE ORAL DAILY
Status: DISCONTINUED | OUTPATIENT
Start: 2017-12-26 | End: 2017-12-27

## 2017-12-25 RX ORDER — GABAPENTIN 300 MG/1
600 CAPSULE ORAL NIGHTLY
Status: DISCONTINUED | OUTPATIENT
Start: 2017-12-25 | End: 2017-12-27

## 2017-12-25 RX ORDER — DONEPEZIL HYDROCHLORIDE 10 MG/1
10 TABLET, FILM COATED ORAL NIGHTLY
Status: DISCONTINUED | OUTPATIENT
Start: 2017-12-25 | End: 2017-12-27

## 2017-12-25 RX ORDER — POTASSIUM CHLORIDE 20 MEQ/1
40 TABLET, EXTENDED RELEASE ORAL ONCE
Status: COMPLETED | OUTPATIENT
Start: 2017-12-25 | End: 2017-12-25

## 2017-12-25 RX ORDER — AMITRIPTYLINE HYDROCHLORIDE 50 MG/1
50 TABLET, FILM COATED ORAL NIGHTLY
Status: DISCONTINUED | OUTPATIENT
Start: 2017-12-25 | End: 2017-12-27

## 2017-12-25 RX ORDER — DIGOXIN 125 MCG
125 TABLET ORAL DAILY
Status: DISCONTINUED | OUTPATIENT
Start: 2017-12-26 | End: 2017-12-27

## 2017-12-25 RX ORDER — CETIRIZINE HYDROCHLORIDE 10 MG/1
10 TABLET ORAL NIGHTLY
Status: DISCONTINUED | OUTPATIENT
Start: 2017-12-25 | End: 2017-12-27

## 2017-12-25 RX ORDER — SODIUM CHLORIDE 9 MG/ML
INJECTION, SOLUTION INTRAVENOUS CONTINUOUS
Status: ACTIVE | OUTPATIENT
Start: 2017-12-25 | End: 2017-12-25

## 2017-12-25 RX ORDER — DICYCLOMINE HCL 20 MG
40 TABLET ORAL EVERY OTHER DAY
Status: DISCONTINUED | OUTPATIENT
Start: 2017-12-26 | End: 2017-12-27

## 2017-12-25 RX ORDER — POTASSIUM CHLORIDE 14.9 MG/ML
20 INJECTION INTRAVENOUS ONCE
Status: COMPLETED | OUTPATIENT
Start: 2017-12-25 | End: 2017-12-25

## 2017-12-25 NOTE — PLAN OF CARE
NURSING ADMISSION NOTE      Patient admitted via Cart  Oriented to room. Safety precautions initiated. Bed in low position. Call light in reach. Received patient from ER. Patient is A&O x3. Admission orders released and initiated.   Placed patient o

## 2017-12-25 NOTE — ED PROVIDER NOTES
Patient Seen in: [facility] Emergency Department    History   Patient presents with:  Nausea/Vomiting/Diarrhea (gastrointestinal)  Abdomen/Flank Pain (GI/)    Stated Complaint: abd pain, nausea/diarrhea    HPI    80year-old woman who comes from [facility] Wicho pacemaker  1999: CHOLECYSTECTOMY      Comment: removal via scope  1987: CYSTOURETHROSCOPY      Comment: [facility]  11-1-2011: CYSTOURETHROSCOPY      Comment: Cystoscopy - dr. Eugene Bliss  4/17/13: CYSTOURETHROSCOPY      Comment: cysto - Dr. Eugene Bliss  [facility] other components within normal limits   URINALYSIS WITH CULTURE REFLEX - Abnormal; Notable for the following:     Clarity Urine Hazy (*)     Ketones Urine Trace (*)     Blood Urine Small (*)     Protein Urine 30  (*)     Leukocyte Esterase Urine Small (*) MG Oral Tablet Dispersible  Take 1 tablet (8 mg total) by mouth every 4 (four) hours as needed for Nausea.   Qty: 10 tablet Refills: 0          Present on Admission  Date Reviewed: 12/19/2017          ICD-10-CM Noted POA    Gastroenteritis K52.9 12/25/2017

## 2017-12-25 NOTE — H&P
[facility] Hospitalist History and Physical      Patient presents with:  Nausea/Vomiting/Diarrhea (gastrointestinal)  Abdomen/Flank Pain (GI/)       PCP: Benigno Orta MD      History of Present Illness: Patient is a 80year old female with PMH sig for asthma, subsequent encounter for fracture with delayed healing       Past Surgical History:  1999: BREAST SURGERY      Comment: lumpectomy  2006: CARDIAC PACEMAKER PLACEMENT      Comment: [employer] pacemaker  1999: CHOLECYSTECTOMY      Comment: removal via scope  19 total) by mouth nightly as needed for Sleep.   docusate sodium 100 MG Oral Cap Take 100 mg by mouth 2 (two) times daily. Montelukast Sodium 10 MG Oral Tab Take 10 mg by mouth nightly.    Warfarin Sodium 2 MG Oral Tab Take 4 mg by mouth 3 (three) times a w Abdomen:   Soft, ND. +bs, diffusely tender. No rebound/guarding   Extremities: Extremities normal, atraumatic, no cyanosis or edema. Skin: Skin color, texture, turgor normal. No rashes or lesions.     Neurologic: Moving all 4 extremities     Data Review 19:26     Approved by: Vani Claire MD            Xr Shoulder, Complete (min 2 Views), Left (cpt=73030)    Result Date: 12/8/2017  PROCEDURE:  XR SHOULDER, COMPLETE (MIN 2 VIEWS), LEFT (CPT=73030)     TECHNIQUE:  Multiple views were obtained.   COMPARISON: aorta. Colonic diverticulosis without evidence of diverticulitis. Cholecystectomy clips. LUMBAR DISC LEVELS: L1-L2:  Minimal diffuse disc bulge with bilateral facet hypertrophy. No spinal canal stenosis. No significant neural foraminal stenosis.  L2-L3 (cpt=71010)    Result Date: 12/8/2017  PROCEDURE:  XR CHEST AP PORTABLE (CPT=71010)  TECHNIQUE:  AP chest radiograph was obtained. COMPARISON:  JENNIFFER XR CHEST AP PORTABLE  (CPT=71010), 6/14/2017, 8:45. INDICATIONS:  Left side chest/ shoulder pain.   Ne

## 2017-12-25 NOTE — PROGRESS NOTES
[facility] Dosing Service  Warfarin (Coumadin) Initial Dosing    Alexa Briceno is a 80year old female with a history of afib (on outpatient coumadin) for whom pharmacy has been consulted to dose warfarin (COUMADIN) by Dr. Mohan Austin.   Based on this indic

## 2017-12-25 NOTE — ED INITIAL ASSESSMENT (HPI)
Onset two days ago with nausea, diarrhea, and LLQ abdominal pain. She also reports urinary urgency. She reports poor appetite and po intake, with pain as she ingests food.

## 2017-12-25 NOTE — ED NOTES
Patient informed for need for UA & stool specimen samples at this time. She reports that she does not need to go at this time.

## 2017-12-26 LAB
BASOPHILS # BLD AUTO: 0.03 X10(3) UL (ref 0–0.1)
BASOPHILS NFR BLD AUTO: 0.5 %
BUN BLD-MCNC: 13 MG/DL (ref 8–20)
CALCIUM BLD-MCNC: 8.3 MG/DL (ref 8.3–10.3)
CHLORIDE: 101 MMOL/L (ref 101–111)
CO2: 28 MMOL/L (ref 22–32)
CREAT BLD-MCNC: 0.52 MG/DL (ref 0.55–1.02)
EOSINOPHIL # BLD AUTO: 0.24 X10(3) UL (ref 0–0.3)
EOSINOPHIL NFR BLD AUTO: 4.2 %
ERYTHROCYTE [DISTWIDTH] IN BLOOD BY AUTOMATED COUNT: 14.1 % (ref 11.5–16)
GLUCOSE BLD-MCNC: 90 MG/DL (ref 70–99)
HAV IGM SER QL: 1.9 MG/DL (ref 1.7–3)
HCT VFR BLD AUTO: 40.9 % (ref 34–50)
HGB BLD-MCNC: 13.9 G/DL (ref 12–16)
IMMATURE GRANULOCYTE COUNT: 0.01 X10(3) UL (ref 0–1)
IMMATURE GRANULOCYTE RATIO %: 0.2 %
INR BLD: 1.86 (ref 0.89–1.11)
LYMPHOCYTES # BLD AUTO: 1.29 X10(3) UL (ref 0.9–4)
LYMPHOCYTES NFR BLD AUTO: 22.7 %
MCH RBC QN AUTO: 29.4 PG (ref 27–33.2)
MCHC RBC AUTO-ENTMCNC: 34 G/DL (ref 31–37)
MCV RBC AUTO: 86.7 FL (ref 81–100)
MONOCYTES # BLD AUTO: 0.53 X10(3) UL (ref 0.1–0.6)
MONOCYTES NFR BLD AUTO: 9.3 %
NEUTROPHIL ABS PRELIM: 3.59 X10 (3) UL (ref 1.3–6.7)
NEUTROPHILS # BLD AUTO: 3.59 X10(3) UL (ref 1.3–6.7)
NEUTROPHILS NFR BLD AUTO: 63.1 %
PLATELET # BLD AUTO: 292 10(3)UL (ref 150–450)
POTASSIUM SERPL-SCNC: 3.6 MMOL/L (ref 3.6–5.1)
POTASSIUM SERPL-SCNC: 3.6 MMOL/L (ref 3.6–5.1)
PSA SERPL DL<=0.01 NG/ML-MCNC: 21.7 SECONDS (ref 12–14.3)
RBC # BLD AUTO: 4.72 X10(6)UL (ref 3.8–5.1)
RED CELL DISTRIBUTION WIDTH-SD: 45 FL (ref 35.1–46.3)
SODIUM SERPL-SCNC: 136 MMOL/L (ref 136–144)
WBC # BLD AUTO: 5.7 X10(3) UL (ref 4–13)

## 2017-12-26 PROCEDURE — 84132 ASSAY OF SERUM POTASSIUM: CPT | Performed by: HOSPITALIST

## 2017-12-26 PROCEDURE — 96376 TX/PRO/DX INJ SAME DRUG ADON: CPT

## 2017-12-26 PROCEDURE — 85025 COMPLETE CBC W/AUTO DIFF WBC: CPT | Performed by: HOSPITALIST

## 2017-12-26 PROCEDURE — 85610 PROTHROMBIN TIME: CPT | Performed by: HOSPITALIST

## 2017-12-26 PROCEDURE — 80048 BASIC METABOLIC PNL TOTAL CA: CPT | Performed by: HOSPITALIST

## 2017-12-26 PROCEDURE — 83735 ASSAY OF MAGNESIUM: CPT | Performed by: HOSPITALIST

## 2017-12-26 RX ORDER — LOPERAMIDE HYDROCHLORIDE 2 MG/1
2 CAPSULE ORAL 4 TIMES DAILY PRN
Status: DISCONTINUED | OUTPATIENT
Start: 2017-12-26 | End: 2017-12-27

## 2017-12-26 RX ORDER — CIPROFLOXACIN 500 MG/1
500 TABLET, FILM COATED ORAL EVERY 12 HOURS SCHEDULED
Status: DISCONTINUED | OUTPATIENT
Start: 2017-12-26 | End: 2017-12-26

## 2017-12-26 RX ORDER — OYSTER SHELL CALCIUM WITH VITAMIN D 500; 200 MG/1; [IU]/1
1 TABLET, FILM COATED ORAL 2 TIMES DAILY
COMMUNITY

## 2017-12-26 RX ORDER — METRONIDAZOLE 500 MG/1
500 TABLET ORAL EVERY 8 HOURS SCHEDULED
Status: DISCONTINUED | OUTPATIENT
Start: 2017-12-26 | End: 2017-12-26

## 2017-12-26 RX ORDER — WARFARIN SODIUM 2 MG/1
4 TABLET ORAL
Status: COMPLETED | OUTPATIENT
Start: 2017-12-26 | End: 2017-12-26

## 2017-12-26 RX ORDER — SODIUM PHOSPHATE, DIBASIC AND SODIUM PHOSPHATE, MONOBASIC 7; 19 G/133ML; G/133ML
1 ENEMA RECTAL ONCE AS NEEDED
COMMUNITY
End: 2018-03-19

## 2017-12-26 RX ORDER — POTASSIUM CHLORIDE 20 MEQ/1
40 TABLET, EXTENDED RELEASE ORAL EVERY 4 HOURS
Status: DISCONTINUED | OUTPATIENT
Start: 2017-12-26 | End: 2017-12-26

## 2017-12-26 RX ORDER — BISACODYL 10 MG
10 SUPPOSITORY, RECTAL RECTAL DAILY PRN
COMMUNITY

## 2017-12-26 NOTE — PROGRESS NOTES
[facility] Hospitalist Progress Note                                                                   April  11/8/1931    CC: F/U abdominal discomfort    SUBJECTIVE:    Pt feels improved Montelukast Sodium  10 mg Oral Nightly   • multivitamin  1 tablet Oral Daily     Continuous Infusions:   • sodium chloride 83 mL/hr at 12/26/17 1532     PRN: Loperamide HCl, acetaminophen, ALPRAZolam, Ondansetron HCl, TraMADol HCl, ondansetron HCl    Glory

## 2017-12-26 NOTE — PHYSICAL THERAPY NOTE
Attempted to see this date for PT eval.  Pt does not have Lula brace present. D/w pt re: need for brace to mobilize. Will f/u on 12/27 to see if family was able to bring in. Pt with recent L1 compression fracture with rec to have on whenever OOB.   Pt

## 2017-12-26 NOTE — PLAN OF CARE
Patient/Family Goals    • Patient/Family Long Term Goal Progressing    • Patient/Family Short Term Goal Progressing          Multidisciplinary Discharge Rounds held 12/26/2017.     Treatment team members present today include , , [facility]

## 2017-12-26 NOTE — PROGRESS NOTES
[facility] Dosing Service  Warfarin (Coumadin) Subsequent Dosing    Danial Mathew is a 80year old female for whom pharmacy has been dosing warfarin (Coumadin).  Goal INR is 2-3    Recent Labs   Lab  12/25/17   0958  12/26/17   0557   INR  1.48*  1.86*

## 2017-12-26 NOTE — PROGRESS NOTES
12/26/17 1656   Clinical Encounter Type   Visited With Patient   Routine Visit Introduction   Continue Visiting No   Patient's Supportive Strategies/Resources  provided emotional support, including active listening and acknowledgement of concern

## 2017-12-26 NOTE — PLAN OF CARE
Patient/Family Goals    • Patient/Family Long Term Goal Progressing    • Patient/Family Short Term Goal Progressing            Pt aox4, maintaining O2 sats on RA. VSS. Clear liquid diet. Pt on coumadin for hx afib. Briefed, incontinent. Stool sample sent.

## 2017-12-27 VITALS
HEIGHT: 67 IN | DIASTOLIC BLOOD PRESSURE: 71 MMHG | BODY MASS INDEX: 20.4 KG/M2 | SYSTOLIC BLOOD PRESSURE: 121 MMHG | TEMPERATURE: 98 F | RESPIRATION RATE: 18 BRPM | WEIGHT: 130 LBS | HEART RATE: 83 BPM | OXYGEN SATURATION: 94 %

## 2017-12-27 LAB
INR BLD: 2.83 (ref 0.89–1.11)
POTASSIUM SERPL-SCNC: 3.3 MMOL/L (ref 3.6–5.1)
POTASSIUM SERPL-SCNC: 3.9 MMOL/L (ref 3.6–5.1)
PSA SERPL DL<=0.01 NG/ML-MCNC: 30.3 SECONDS (ref 12–14.3)

## 2017-12-27 PROCEDURE — 97162 PT EVAL MOD COMPLEX 30 MIN: CPT

## 2017-12-27 PROCEDURE — 85610 PROTHROMBIN TIME: CPT | Performed by: HOSPITALIST

## 2017-12-27 PROCEDURE — 84132 ASSAY OF SERUM POTASSIUM: CPT | Performed by: HOSPITALIST

## 2017-12-27 PROCEDURE — 97116 GAIT TRAINING THERAPY: CPT

## 2017-12-27 RX ORDER — POTASSIUM CHLORIDE 20 MEQ/1
40 TABLET, EXTENDED RELEASE ORAL EVERY 4 HOURS
Status: COMPLETED | OUTPATIENT
Start: 2017-12-27 | End: 2017-12-27

## 2017-12-27 RX ORDER — POTASSIUM CHLORIDE 750 MG/1
20 TABLET, EXTENDED RELEASE ORAL 2 TIMES DAILY
Qty: 20 TABLET | Refills: 0 | Status: SHIPPED | OUTPATIENT
Start: 2017-12-27 | End: 2018-01-03

## 2017-12-27 RX ORDER — ONDANSETRON 8 MG/1
8 TABLET, ORALLY DISINTEGRATING ORAL EVERY 4 HOURS PRN
Qty: 10 TABLET | Refills: 0 | Status: SHIPPED | OUTPATIENT
Start: 2017-12-27 | End: 2018-01-03

## 2017-12-27 RX ORDER — PSEUDOEPHEDRINE HCL 30 MG
100 TABLET ORAL 2 TIMES DAILY PRN
Qty: 30 CAPSULE | Refills: 0 | Status: SHIPPED | OUTPATIENT
Start: 2017-12-27 | End: 2018-02-08

## 2017-12-27 RX ORDER — LOPERAMIDE HYDROCHLORIDE 2 MG/1
2 CAPSULE ORAL 4 TIMES DAILY PRN
Qty: 20 CAPSULE | Refills: 0 | Status: SHIPPED | OUTPATIENT
Start: 2017-12-27

## 2017-12-27 NOTE — CM/SW NOTE
Spoke with Yusef from [facility] who confirmed pt can be accepted for admission today. Met with pt and spoke with pt's son, Alex Girard by speaker phone. Pt and family in agreement with plan. Discussed transportation options.   Pt/son feel pt is able to travel b

## 2017-12-27 NOTE — PROGRESS NOTES
[facility] Dosing Service  Warfarin (Coumadin) Subsequent Dosing    Candida Noe is a 80year old female for whom pharmacy has been dosing warfarin (Coumadin).  Goal INR is 2-3    Recent Labs   Lab  12/25/17   0958  12/26/17   0557  12/27/17   0701   IN

## 2017-12-27 NOTE — PROGRESS NOTES
[facility] Hospitalist Progress Note                                                                   April  11/8/1931    CC: F/U abdominal discomfort    SUBJECTIVE:    Pt feels improved, Nightly   • Methenamine Hippurate  1 g Oral Daily   • Fluticasone Furoate  1 puff Inhalation Daily   • Montelukast Sodium  10 mg Oral Nightly   • multivitamin  1 tablet Oral Daily     Continuous Infusions:   • sodium chloride 83 mL/hr at 12/27/17 8583

## 2017-12-27 NOTE — PLAN OF CARE
GASTROINTESTINAL - ADULT    • Maintains or returns to baseline bowel function Adequate for Discharge        Impaired Functional Mobility    • Achieve highest/safest level of mobility/gait Adequate for Discharge        Patient/Family Goals    • Patient/Fami

## 2017-12-27 NOTE — PHYSICAL THERAPY NOTE
PHYSICAL THERAPY EVALUATION - INPATIENT     Room Number: 458/526-C  Evaluation Date: 12/27/2017  Type of Evaluation: Initial  Physician Order: PT Eval and Treat    Presenting Problem: Gastroenteritis, dehydration, hypokalemia  Reason for Therapy: Mobil • DNR (do not resuscitate)     I signed form 7/17/17   • Essential hypertension    • Flatulence/gas pain/belching    • Food intolerance    • Frequent UTI    • GERD (gastroesophageal reflux disease)    • Glaucoma    • Heart palpitations    • Hemorrhoids fracture. Pt received rehab at [facility] in [facility]. Pt ambulated without an Ad prior to rehab.  Pt reports that she took care of her  who also receives 2 hours of care in the am and pm. Pt's  reported that he had a fall yesterday in indep INPATIENT SHORT FORM - BASIC MOBILITY  How much difficulty does the patient currently have. ..  -   Turning over in bed (including adjusting bedclothes, sheets and blankets)?: A Little   -   Sitting down on and standing up from a chair with arms (e.g., whee Provided:  Bed mobility  Body mechanics  Don/Doff ortho/prosthesis  Gait training  Posture  Strengthening  Transfer training    Patient End of Session: In bed;Needs met;Call light within reach;RN aware of session/findings; All patient questions and concerns education;Gait training;Strengthening;Transfer training;Balance training  Rehab Potential : Good  Frequency (Obs): 5x/week  Number of Visits to Meet Established Goals: 3      CURRENT GOALS    Goal #1 Patient is able to demonstrate supine - sit EOB @ level:

## 2017-12-27 NOTE — PLAN OF CARE
GASTROINTESTINAL - ADULT    • Maintains or returns to baseline bowel function Progressing        Patient/Family Goals    • Patient/Family Long Term Goal Progressing    • Patient/Family Short Term Goal Progressing        SAFETY ADULT - FALL    • Free from f

## 2017-12-27 NOTE — DISCHARGE SUMMARY
General Medicine Discharge Summary     Patient ID:  Radha Powers  80year old  11/8/1931    Admit date: 12/25/2017    Discharge date and time: 12/27/17    Attending Physician: Renny Fields MD     Prim thickening of the sigmoid colon concerning for a nonspecific colitis. PCT negative.  As pt improved with IVF and supportive measures, suspect this is viral in etiology as opposed to bacterial      Lightheadedness-improved with IVF    Hyponatremia- improved PRIOR OR 4 HOURS AFTER    magnesium hydroxide 400 MG/5ML Oral Suspension  Take 30 mL by mouth daily as needed. TraMADol HCl 50 MG Oral Tab  Take 1 tablet (50 mg total) by mouth every 8 (eight) hours as needed for Pain.     fluticasone-salmeterol (ADVAI Code      Exam on day of discharge:      12/27/17  1206   BP: 121/71   Pulse: 83   Resp: 18   Temp: 98.3 °F (36.8 °C)       General: no acute distress  Heart: RRR  Lungs: clear bilaterally, no active wheezing  Abdomen: mild TTP in LLQ, nondistended, intact

## 2017-12-27 NOTE — CM/SW NOTE
Met with pt who is an 81 y/o woman admitted for gastroenteritis. Pt was recently in the hospital 12/6-12/9 with compression fracture s/p fall. At that time she was discharged to [facility] in [address] [place] [address]. Pt stated that she stayed there for about 1 week. Housing  ([address] [place] [address])   Number of Levels in Home 1   Patient lives with Spouse   Patient Status Prior to Admission   Independent with ADLs and Mobility No   Services in place prior to admission DME/Supplies at [facility]

## 2017-12-27 NOTE — CM/SW NOTE
Spoke with PT who are recommending NITHIN at CT. Spoke with pt's son, Amy Eduardo ([phone]) regarding PT recommendations. Pt's son stated he spoke with pt today about possible NITHIN. Pt stated she did not want to return to [facility], but was open to other facilities.

## 2017-12-29 ENCOUNTER — SNF VISIT (OUTPATIENT)
Dept: INTERNAL MEDICINE CLINIC | Age: 82
End: 2017-12-29

## 2017-12-29 VITALS
DIASTOLIC BLOOD PRESSURE: 60 MMHG | HEART RATE: 73 BPM | SYSTOLIC BLOOD PRESSURE: 136 MMHG | OXYGEN SATURATION: 97 % | RESPIRATION RATE: 20 BRPM | TEMPERATURE: 97 F

## 2017-12-29 DIAGNOSIS — S32.010D CLOSED COMPRESSION FRACTURE OF L1 LUMBAR VERTEBRA WITH ROUTINE HEALING, SUBSEQUENT ENCOUNTER: ICD-10-CM

## 2017-12-29 DIAGNOSIS — Z79.01 CURRENT USE OF LONG TERM ANTICOAGULATION: ICD-10-CM

## 2017-12-29 DIAGNOSIS — Z79.899 MEDICATION DOSE DECREASED: ICD-10-CM

## 2017-12-29 DIAGNOSIS — Z87.19 HISTORY OF GASTROENTERITIS: ICD-10-CM

## 2017-12-29 DIAGNOSIS — R53.81 PHYSICAL DECONDITIONING: Primary | ICD-10-CM

## 2017-12-29 PROCEDURE — 99309 SBSQ NF CARE MODERATE MDM 30: CPT | Performed by: NURSE PRACTITIONER

## 2017-12-29 PROCEDURE — 99356 PROLONGED SERV,INPATIENT,1ST HR: CPT | Performed by: NURSE PRACTITIONER

## 2017-12-29 NOTE — PROGRESS NOTES
Sujata Valdez  : 1931  Age 80year old  female patient is admitted to Facility: [facility] [address] [place] [address] date:  17  Discharge date to [facility]:  17  ELOS:  12-14 days  Anticipated discharge date:  18  D degeneration, bilateral    • Neuropathy     Unclear Etiology   •  (normal spontaneous vaginal delivery) 38,86,06,03    Vaginal births x 4   • Recurrent UTI     on Daily oral Cefuroxime and Methamine   • Uncomfortable fullness after meals    • Wedge com MEQ Oral Tab CR Take 2 tablets (20 mEq total) by mouth 2 (two) times daily. Disp: 20 tablet Rfl: 0   Calcium Carbonate-Vitamin D 500-200 MG-UNIT Oral Tab Take 1 tablet by mouth 2 (two) times daily.  Disp:  Rfl:    bisacodyl 10 MG Rectal Suppos Place 10 mg r Rfl:    Methenamine Hippurate 1 g Oral Tab Take 1 tablet (1 g total) by mouth daily. Disp: 90 tablet Rfl: 3   acetaminophen 500 MG Oral Tab Take 500 mg by mouth every 6 (six) hours as needed for Pain.    Disp:  Rfl:    TiZANidine HCl 2 MG Oral Tab Take 1 ta eyes  HENT: normocephalic; normal nose, no nasal drainage, mucous membranes pink, moist, pharynx no exudate  NECK: ---supple, full range of motion, no apparent masses  BREAST: deferred exam  RESPIRATORY:---  CARDIOVASCULAR: S1, S2 normal, RRR; no S3, no S4 147 L [place]  POTASSIUM 3.7 mEq/L 3.7 - 5.1 Final  CHLORIDE 98 mEq/L 97 - 108 Final  CARBON DIOXIDE 26 mM/L 20 - 32 Final  SGPT(ALT) 18 U/L 0 - 35 Final  SGOT(AST) 22 U/L 10 - 22 Final  ALK. PHOSPHATASE 150 U/L 33 - 144 H Final  CALCIUM 9.0 mg/dl 8.2 - 10. Q Sun, Tues, Thurs, Sat  Coumadin 3.5mg po Q Mon, Wed, Fri  INR next 12/31/17    Hypokalemia  Potassium Chloride 20mEq po BID   Repeat BMP 12/31/17    Asthma  Advair Diskus; inhalation po BID  Montelukast 10mg po nightly  Mometasone/Asmanex inhaler; 1 puff

## 2018-01-03 ENCOUNTER — SNF VISIT (OUTPATIENT)
Dept: INTERNAL MEDICINE CLINIC | Age: 83
End: 2018-01-03

## 2018-01-03 VITALS
RESPIRATION RATE: 16 BRPM | DIASTOLIC BLOOD PRESSURE: 78 MMHG | HEART RATE: 92 BPM | TEMPERATURE: 98 F | SYSTOLIC BLOOD PRESSURE: 118 MMHG | OXYGEN SATURATION: 96 %

## 2018-01-03 DIAGNOSIS — Z79.01 CURRENT USE OF LONG TERM ANTICOAGULATION: ICD-10-CM

## 2018-01-03 DIAGNOSIS — Z86.39 HISTORY OF LOW POTASSIUM: Primary | ICD-10-CM

## 2018-01-03 DIAGNOSIS — R53.81 PHYSICAL DECONDITIONING: ICD-10-CM

## 2018-01-03 DIAGNOSIS — Z79.899 MEDICATION MANAGEMENT: ICD-10-CM

## 2018-01-03 PROBLEM — E86.0 DEHYDRATION: Status: RESOLVED | Noted: 2017-12-25 | Resolved: 2018-01-03

## 2018-01-03 PROCEDURE — 99308 SBSQ NF CARE LOW MDM 20: CPT | Performed by: NURSE PRACTITIONER

## 2018-01-03 RX ORDER — POTASSIUM CHLORIDE 750 MG/1
10 TABLET, FILM COATED, EXTENDED RELEASE ORAL 2 TIMES DAILY
COMMUNITY
End: 2018-03-15

## 2018-01-03 NOTE — PROGRESS NOTES
Danial [place], 68/1931, 80year old, female    Chief Complaint:  Patient presents with:  Lab Results  Musculoskeletal Problem  Weakness     [facility] Admit date:  12/25/17  Discharge date to [facility]:  12/27/17  ELOS:  12-14 days  Anticipated discharge Dr. Valencia Corea and resumed KCl 10mEq po BID. Will repeat BMP 1/5/18. INR 12/31==2.32.  Repeat PT/INR 1/5/18. RN aware. No further questions/concerns per patient/spouse/staff.     Objective:  /78   Pulse 92   Temp 97.6 °F (36.4 °C)   Resp 16   Sp BID  INR 12/28=3.39; Coumadin Held  INR 12/29=2.91; Coumadin 4mg decreased to 3.5mg  Coumadin 2mg po Q Sun, Tues, Thurs, Sat  Coumadin 3.5mg po Q Mon, Wed, Fri  INR next 1/5/18     Hypokalemia, Hyperkalemia  Potassium Chloride 20mEq po BID   K elevated to

## 2018-01-08 ENCOUNTER — SNF VISIT (OUTPATIENT)
Dept: INTERNAL MEDICINE CLINIC | Age: 83
End: 2018-01-08

## 2018-01-08 VITALS
TEMPERATURE: 98 F | HEART RATE: 87 BPM | SYSTOLIC BLOOD PRESSURE: 139 MMHG | OXYGEN SATURATION: 96 % | RESPIRATION RATE: 16 BRPM | DIASTOLIC BLOOD PRESSURE: 87 MMHG

## 2018-01-08 DIAGNOSIS — S32.010D CLOSED COMPRESSION FRACTURE OF L1 LUMBAR VERTEBRA WITH ROUTINE HEALING, SUBSEQUENT ENCOUNTER: ICD-10-CM

## 2018-01-08 DIAGNOSIS — Z79.01 CURRENT USE OF LONG TERM ANTICOAGULATION: ICD-10-CM

## 2018-01-08 DIAGNOSIS — Z86.39 HISTORY OF LOW POTASSIUM: ICD-10-CM

## 2018-01-08 DIAGNOSIS — R53.81 PHYSICAL DECONDITIONING: ICD-10-CM

## 2018-01-08 DIAGNOSIS — Z79.899 MEDICATION MANAGEMENT: Primary | ICD-10-CM

## 2018-01-08 PROCEDURE — 99308 SBSQ NF CARE LOW MDM 20: CPT | Performed by: NURSE PRACTITIONER

## 2018-01-08 NOTE — PROGRESS NOTES
Brayan Siegel, 68/1931, 80year old, female    Chief Complaint:  Patient presents with:  Weakness  Musculoskeletal Problem  Constipation  Med Reconcilliation     [facility] Admit date:  12/25/17  Discharge date to [facility]:  12/27/17  ELOS:  12-14 days 139/87   Pulse 87   Temp 97.9 °F (36.6 °C)   Resp 16   SpO2 96%     PHYSICAL EXAM:   GENERAL HEALTH: well developed, well nourished, petite elderly female, wearing brace sitting in WC in room after therapy  SKIN: no rashes, no suspicious lesions, pink, war should be interpreted  as 'above 61 mL/min/1.73m2' not as an exact number. If you have not provided a Date of Birth for this patient,  the eGFR may or may not be properly represented on this  report.   1 of 2  [address]  Lab Results Report  Lab nightly     Recent L1 Fx  PT/OT  Back brace when out of bed  Tizanidine muscle relaxant; 2mg po nightly     Pain management  Gabapentin 600mg po nightly  Tramadol 50mg po q 8 hrs PRN pain  Tylenol 500mg po q 6 hrs PRN pain     UTI Prophylaxis  Methenamine

## 2018-01-09 ENCOUNTER — LAB REQUISITION (OUTPATIENT)
Dept: LAB | Facility: HOSPITAL | Age: 83
End: 2018-01-09
Payer: MEDICARE

## 2018-01-09 DIAGNOSIS — I48.91 ATRIAL FIBRILLATION (HCC): ICD-10-CM

## 2018-01-09 LAB
BUN BLD-MCNC: 9 MG/DL (ref 8–20)
CALCIUM BLD-MCNC: 9.3 MG/DL (ref 8.3–10.3)
CHLORIDE: 98 MMOL/L (ref 101–111)
CO2: 28 MMOL/L (ref 22–32)
CREAT BLD-MCNC: 0.72 MG/DL (ref 0.55–1.02)
GLUCOSE BLD-MCNC: 99 MG/DL (ref 70–99)
INR BLD: 3.6 (ref 0.89–1.11)
POTASSIUM SERPL-SCNC: 4 MMOL/L (ref 3.6–5.1)
PSA SERPL DL<=0.01 NG/ML-MCNC: 36.8 SECONDS (ref 12–14.3)
SODIUM SERPL-SCNC: 136 MMOL/L (ref 136–144)

## 2018-01-09 PROCEDURE — 80048 BASIC METABOLIC PNL TOTAL CA: CPT | Performed by: INTERNAL MEDICINE

## 2018-01-09 PROCEDURE — 85610 PROTHROMBIN TIME: CPT | Performed by: INTERNAL MEDICINE

## 2018-01-10 ENCOUNTER — SNF VISIT (OUTPATIENT)
Dept: INTERNAL MEDICINE CLINIC | Age: 83
End: 2018-01-10

## 2018-01-10 VITALS
SYSTOLIC BLOOD PRESSURE: 124 MMHG | HEART RATE: 72 BPM | RESPIRATION RATE: 16 BRPM | DIASTOLIC BLOOD PRESSURE: 58 MMHG | TEMPERATURE: 98 F | OXYGEN SATURATION: 97 %

## 2018-01-10 DIAGNOSIS — R53.81 PHYSICAL DECONDITIONING: ICD-10-CM

## 2018-01-10 DIAGNOSIS — S32.010D CLOSED COMPRESSION FRACTURE OF L1 LUMBAR VERTEBRA WITH ROUTINE HEALING, SUBSEQUENT ENCOUNTER: ICD-10-CM

## 2018-01-10 DIAGNOSIS — K59.00 CONSTIPATION, UNSPECIFIED CONSTIPATION TYPE: ICD-10-CM

## 2018-01-10 DIAGNOSIS — R79.1 SUPRATHERAPEUTIC INR: Primary | ICD-10-CM

## 2018-01-10 PROCEDURE — 99309 SBSQ NF CARE MODERATE MDM 30: CPT | Performed by: NURSE PRACTITIONER

## 2018-01-11 ENCOUNTER — LAB REQUISITION (OUTPATIENT)
Dept: LAB | Facility: HOSPITAL | Age: 83
End: 2018-01-11
Payer: MEDICARE

## 2018-01-11 DIAGNOSIS — I48.20 CHRONIC ATRIAL FIBRILLATION (HCC): ICD-10-CM

## 2018-01-11 LAB
INR BLD: 3.04 (ref 0.89–1.11)
PSA SERPL DL<=0.01 NG/ML-MCNC: 32.1 SECONDS (ref 12–14.3)

## 2018-01-11 PROCEDURE — 85610 PROTHROMBIN TIME: CPT | Performed by: INTERNAL MEDICINE

## 2018-01-11 NOTE — PROGRESS NOTES
Colten Diego, 68/1931, 80year old, female    Chief Complaint:  Patient presents with:  Weakness  Musculoskeletal Problem  Lab Results  [facility] Admit date:  12/25/17  Discharge date to [facility]:  12/27/17  ELOS:  12-14 days room air in no apparent distress, denies cough, shortness of breath or dyspnea on exertion. No chest pain, palpitations, dizziness or fatigue. Patient denies nausea, vomiting or diarrhea.   Patient admits to continued problems with intermittent constipati BID  INR 12/28=3.39; Coumadin Held  INR 12/29=2.91; Coumadin 4mg decreased to 3.5mg  Coumadin 2mg po Q Sun, Tues, Thurs, Sat  Coumadin 3.5mg po Q Mon, Wed, Fri  INR 1/5/18=1.5; Coumadin to 4mg per Dr. Valencia Corea  INR 1/9=3.6; HOLD Coumadin x 1 dose on 1/10,

## 2018-01-18 PROBLEM — J30.89 CHRONIC NON-SEASONAL ALLERGIC RHINITIS, UNSPECIFIED TRIGGER: Status: RESOLVED | Noted: 2017-07-17 | Resolved: 2018-01-18

## 2018-01-18 PROBLEM — K52.9 GASTROENTERITIS: Status: RESOLVED | Noted: 2017-12-25 | Resolved: 2018-01-18

## 2018-01-20 PROBLEM — E87.6 HYPOKALEMIA: Status: RESOLVED | Noted: 2017-12-25 | Resolved: 2018-01-20

## 2018-01-20 PROBLEM — I70.0 ATHEROSCLEROSIS OF AORTA (HCC): Status: ACTIVE | Noted: 2018-01-20

## 2018-01-20 PROBLEM — R60.9 EDEMA, UNSPECIFIED TYPE: Status: RESOLVED | Noted: 2017-07-17 | Resolved: 2018-01-20

## 2018-02-08 ENCOUNTER — APPOINTMENT (OUTPATIENT)
Dept: CT IMAGING | Facility: HOSPITAL | Age: 83
End: 2018-02-08
Attending: EMERGENCY MEDICINE
Payer: MEDICARE

## 2018-02-08 ENCOUNTER — HOSPITAL ENCOUNTER (OUTPATIENT)
Facility: HOSPITAL | Age: 83
Setting detail: OBSERVATION
Discharge: HOME HEALTH | End: 2018-02-11
Attending: EMERGENCY MEDICINE | Admitting: HOSPITALIST
Payer: MEDICARE

## 2018-02-08 DIAGNOSIS — E87.6 HYPOKALEMIA: ICD-10-CM

## 2018-02-08 DIAGNOSIS — R53.1 WEAKNESS GENERALIZED: Primary | ICD-10-CM

## 2018-02-08 DIAGNOSIS — R19.7 DIARRHEA, UNSPECIFIED TYPE: ICD-10-CM

## 2018-02-08 LAB
ALBUMIN SERPL-MCNC: 3.5 G/DL (ref 3.5–4.8)
ALP LIVER SERPL-CCNC: 164 U/L (ref 55–142)
ALT SERPL-CCNC: 19 U/L (ref 14–54)
APTT PPP: 53.2 SECONDS (ref 25–34)
AST SERPL-CCNC: 29 U/L (ref 15–41)
BASOPHILS # BLD AUTO: 0.03 X10(3) UL (ref 0–0.1)
BASOPHILS NFR BLD AUTO: 0.6 %
BILIRUB SERPL-MCNC: 0.7 MG/DL (ref 0.1–2)
BILIRUB UR QL STRIP.AUTO: NEGATIVE
BUN BLD-MCNC: 5 MG/DL (ref 8–20)
CALCIUM BLD-MCNC: 9.1 MG/DL (ref 8.3–10.3)
CHLORIDE: 99 MMOL/L (ref 101–111)
CLARITY UR REFRACT.AUTO: CLEAR
CO2: 31 MMOL/L (ref 22–32)
COLOR UR AUTO: YELLOW
CREAT BLD-MCNC: 0.61 MG/DL (ref 0.55–1.02)
EOSINOPHIL # BLD AUTO: 0.18 X10(3) UL (ref 0–0.3)
EOSINOPHIL NFR BLD AUTO: 3.8 %
ERYTHROCYTE [DISTWIDTH] IN BLOOD BY AUTOMATED COUNT: 15.8 % (ref 11.5–16)
GLUCOSE BLD-MCNC: 106 MG/DL (ref 70–99)
GLUCOSE UR STRIP.AUTO-MCNC: NEGATIVE MG/DL
HCT VFR BLD AUTO: 36.4 % (ref 34–50)
HGB BLD-MCNC: 12.6 G/DL (ref 12–16)
IMMATURE GRANULOCYTE COUNT: 0.02 X10(3) UL (ref 0–1)
IMMATURE GRANULOCYTE RATIO %: 0.4 %
INR BLD: 2.95 (ref 0.89–1.11)
KETONES UR STRIP.AUTO-MCNC: NEGATIVE MG/DL
LEUKOCYTE ESTERASE UR QL STRIP.AUTO: NEGATIVE
LYMPHOCYTES # BLD AUTO: 1.14 X10(3) UL (ref 0.9–4)
LYMPHOCYTES NFR BLD AUTO: 24.2 %
M PROTEIN MFR SERPL ELPH: 7.3 G/DL (ref 6.1–8.3)
MCH RBC QN AUTO: 29.4 PG (ref 27–33.2)
MCHC RBC AUTO-ENTMCNC: 34.6 G/DL (ref 31–37)
MCV RBC AUTO: 85 FL (ref 81–100)
MONOCYTES # BLD AUTO: 0.43 X10(3) UL (ref 0.1–1)
MONOCYTES NFR BLD AUTO: 9.1 %
NEUTROPHIL ABS PRELIM: 2.92 X10 (3) UL (ref 1.3–6.7)
NEUTROPHILS # BLD AUTO: 2.92 X10(3) UL (ref 1.3–6.7)
NEUTROPHILS NFR BLD AUTO: 61.9 %
NITRITE UR QL STRIP.AUTO: NEGATIVE
PH UR STRIP.AUTO: 7 [PH] (ref 4.5–8)
PLATELET # BLD AUTO: 279 10(3)UL (ref 150–450)
POTASSIUM SERPL-SCNC: 2.9 MMOL/L (ref 3.6–5.1)
PROT UR STRIP.AUTO-MCNC: NEGATIVE MG/DL
PSA SERPL DL<=0.01 NG/ML-MCNC: 31.4 SECONDS (ref 12–14.3)
RBC # BLD AUTO: 4.28 X10(6)UL (ref 3.8–5.1)
RBC UR QL AUTO: NEGATIVE
RED CELL DISTRIBUTION WIDTH-SD: 48.9 FL (ref 35.1–46.3)
SODIUM SERPL-SCNC: 137 MMOL/L (ref 136–144)
SP GR UR STRIP.AUTO: 1.01 (ref 1–1.03)
UROBILINOGEN UR STRIP.AUTO-MCNC: <2 MG/DL
WBC # BLD AUTO: 4.7 X10(3) UL (ref 4–13)

## 2018-02-08 PROCEDURE — 80053 COMPREHEN METABOLIC PANEL: CPT | Performed by: EMERGENCY MEDICINE

## 2018-02-08 PROCEDURE — 85025 COMPLETE CBC W/AUTO DIFF WBC: CPT | Performed by: EMERGENCY MEDICINE

## 2018-02-08 PROCEDURE — 85610 PROTHROMBIN TIME: CPT | Performed by: EMERGENCY MEDICINE

## 2018-02-08 PROCEDURE — 96365 THER/PROPH/DIAG IV INF INIT: CPT

## 2018-02-08 PROCEDURE — 99285 EMERGENCY DEPT VISIT HI MDM: CPT

## 2018-02-08 PROCEDURE — 81003 URINALYSIS AUTO W/O SCOPE: CPT | Performed by: EMERGENCY MEDICINE

## 2018-02-08 PROCEDURE — 93005 ELECTROCARDIOGRAM TRACING: CPT

## 2018-02-08 PROCEDURE — 96361 HYDRATE IV INFUSION ADD-ON: CPT

## 2018-02-08 PROCEDURE — 96366 THER/PROPH/DIAG IV INF ADDON: CPT

## 2018-02-08 PROCEDURE — 74177 CT ABD & PELVIS W/CONTRAST: CPT | Performed by: EMERGENCY MEDICINE

## 2018-02-08 PROCEDURE — 85730 THROMBOPLASTIN TIME PARTIAL: CPT | Performed by: EMERGENCY MEDICINE

## 2018-02-08 PROCEDURE — 93010 ELECTROCARDIOGRAM REPORT: CPT

## 2018-02-08 RX ORDER — POTASSIUM CHLORIDE 750 MG/1
10 TABLET, EXTENDED RELEASE ORAL 2 TIMES DAILY
Status: DISCONTINUED | OUTPATIENT
Start: 2018-02-08 | End: 2018-02-11

## 2018-02-08 RX ORDER — TRAMADOL HYDROCHLORIDE 50 MG/1
50 TABLET ORAL EVERY 12 HOURS PRN
Status: DISCONTINUED | OUTPATIENT
Start: 2018-02-08 | End: 2018-02-11

## 2018-02-08 RX ORDER — CHOLESTYRAMINE 4 G/9G
4 POWDER, FOR SUSPENSION ORAL DAILY
COMMUNITY

## 2018-02-08 RX ORDER — DONEPEZIL HYDROCHLORIDE 10 MG/1
10 TABLET, FILM COATED ORAL NIGHTLY
Status: DISCONTINUED | OUTPATIENT
Start: 2018-02-08 | End: 2018-02-11

## 2018-02-08 RX ORDER — DOCUSATE SODIUM 100 MG/1
100 CAPSULE, LIQUID FILLED ORAL 2 TIMES DAILY
Status: DISCONTINUED | OUTPATIENT
Start: 2018-02-08 | End: 2018-02-11

## 2018-02-08 RX ORDER — METHENAMINE HIPPURATE 1000 MG/1
1 TABLET ORAL DAILY
Status: DISCONTINUED | OUTPATIENT
Start: 2018-02-09 | End: 2018-02-11

## 2018-02-08 RX ORDER — DIGOXIN 125 MCG
125 TABLET ORAL DAILY
Status: DISCONTINUED | OUTPATIENT
Start: 2018-02-09 | End: 2018-02-11

## 2018-02-08 RX ORDER — FAMOTIDINE 20 MG/1
20 TABLET ORAL DAILY
Status: DISCONTINUED | OUTPATIENT
Start: 2018-02-09 | End: 2018-02-11

## 2018-02-08 RX ORDER — WARFARIN SODIUM 2 MG/1
2 TABLET ORAL
Status: COMPLETED | OUTPATIENT
Start: 2018-02-08 | End: 2018-02-08

## 2018-02-08 RX ORDER — DILTIAZEM HYDROCHLORIDE 180 MG/1
180 CAPSULE, EXTENDED RELEASE ORAL DAILY
Status: DISCONTINUED | OUTPATIENT
Start: 2018-02-09 | End: 2018-02-11

## 2018-02-08 RX ORDER — DILTIAZEM HYDROCHLORIDE 180 MG/1
180 CAPSULE, COATED, EXTENDED RELEASE ORAL DAILY
COMMUNITY

## 2018-02-08 RX ORDER — SODIUM CHLORIDE 9 MG/ML
INJECTION, SOLUTION INTRAVENOUS CONTINUOUS
Status: DISCONTINUED | OUTPATIENT
Start: 2018-02-08 | End: 2018-02-09

## 2018-02-08 RX ORDER — DONEPEZIL HYDROCHLORIDE 10 MG/1
10 TABLET, FILM COATED ORAL NIGHTLY
COMMUNITY

## 2018-02-08 RX ORDER — DEXTROSE AND SODIUM CHLORIDE 5; .45 G/100ML; G/100ML
INJECTION, SOLUTION INTRAVENOUS CONTINUOUS
Status: ACTIVE | OUTPATIENT
Start: 2018-02-08 | End: 2018-02-08

## 2018-02-08 RX ORDER — SODIUM CHLORIDE 9 MG/ML
125 INJECTION, SOLUTION INTRAVENOUS CONTINUOUS
Status: DISCONTINUED | OUTPATIENT
Start: 2018-02-08 | End: 2018-02-09

## 2018-02-08 RX ORDER — DOCUSATE SODIUM 100 MG/1
100 CAPSULE, LIQUID FILLED ORAL 2 TIMES DAILY
COMMUNITY

## 2018-02-08 RX ORDER — DICYCLOMINE HCL 20 MG
20 TABLET ORAL EVERY 4 HOURS PRN
Status: DISCONTINUED | OUTPATIENT
Start: 2018-02-08 | End: 2018-02-11

## 2018-02-08 RX ORDER — DIGOXIN 125 MCG
125 TABLET ORAL DAILY
COMMUNITY

## 2018-02-08 RX ORDER — RANITIDINE 150 MG/1
150 CAPSULE ORAL EVERY EVENING
COMMUNITY

## 2018-02-08 RX ORDER — MONTELUKAST SODIUM 10 MG/1
10 TABLET ORAL NIGHTLY
Status: DISCONTINUED | OUTPATIENT
Start: 2018-02-08 | End: 2018-02-11

## 2018-02-08 RX ORDER — ALPRAZOLAM 0.25 MG/1
0.25 TABLET ORAL NIGHTLY PRN
Status: DISCONTINUED | OUTPATIENT
Start: 2018-02-08 | End: 2018-02-11

## 2018-02-08 RX ORDER — TRIAMTERENE AND HYDROCHLOROTHIAZIDE 37.5; 25 MG/1; MG/1
1 CAPSULE ORAL EVERY MORNING
COMMUNITY

## 2018-02-08 RX ORDER — CHOLESTYRAMINE LIGHT 4 G/5.7G
4 POWDER, FOR SUSPENSION ORAL DAILY
Status: DISCONTINUED | OUTPATIENT
Start: 2018-02-09 | End: 2018-02-11

## 2018-02-08 RX ORDER — AMITRIPTYLINE HYDROCHLORIDE 50 MG/1
50 TABLET, FILM COATED ORAL NIGHTLY
Status: DISCONTINUED | OUTPATIENT
Start: 2018-02-08 | End: 2018-02-11

## 2018-02-08 RX ORDER — CETIRIZINE HYDROCHLORIDE 10 MG/1
10 TABLET ORAL NIGHTLY
Status: DISCONTINUED | OUTPATIENT
Start: 2018-02-08 | End: 2018-02-11

## 2018-02-08 RX ORDER — GABAPENTIN 300 MG/1
600 CAPSULE ORAL NIGHTLY
Status: DISCONTINUED | OUTPATIENT
Start: 2018-02-08 | End: 2018-02-11

## 2018-02-08 RX ORDER — FLUTICASONE PROPIONATE AND SALMETEROL 100; 50 UG/1; UG/1
1 POWDER RESPIRATORY (INHALATION) 2 TIMES DAILY
COMMUNITY

## 2018-02-08 RX ORDER — MULTIPLE VITAMINS W/ MINERALS TAB 9MG-400MCG
1 TAB ORAL DAILY
Status: DISCONTINUED | OUTPATIENT
Start: 2018-02-09 | End: 2018-02-11

## 2018-02-08 RX ORDER — ACETAMINOPHEN 500 MG
500 TABLET ORAL EVERY 6 HOURS PRN
Status: DISCONTINUED | OUTPATIENT
Start: 2018-02-08 | End: 2018-02-11

## 2018-02-08 RX ORDER — ONDANSETRON 2 MG/ML
4 INJECTION INTRAMUSCULAR; INTRAVENOUS EVERY 6 HOURS PRN
Status: DISCONTINUED | OUTPATIENT
Start: 2018-02-08 | End: 2018-02-11

## 2018-02-08 RX ORDER — AMITRIPTYLINE HYDROCHLORIDE 50 MG/1
50 TABLET, FILM COATED ORAL NIGHTLY
COMMUNITY

## 2018-02-08 RX ORDER — TIZANIDINE 4 MG/1
2 TABLET ORAL NIGHTLY PRN
Status: DISCONTINUED | OUTPATIENT
Start: 2018-02-08 | End: 2018-02-11

## 2018-02-08 NOTE — ED INITIAL ASSESSMENT (HPI)
Pt here for multiple complaints. abdomen cramps and diarrhea x 4 days. 5 episodes/day. Pt also complaining of unable to reach the bathroom at night when voiding. Pt also complaining of low back pain.  Had a fall 2 weeks ago and was told she had a fracture o

## 2018-02-08 NOTE — ED PROVIDER NOTES
Patient Seen in: [facility] Emergency Department    History   Patient presents with:  Nausea/Vomiting/Diarrhea (gastrointestinal)  Back Pain (musculoskeletal)    Stated Complaint: diarrhea x 4 days, fell 2 weeks ago, Hx of L1-2 fracture, complaining bilateral    • Neuropathy     Unclear Etiology   • Recurrent UTI     on Daily oral Cefuroxime and Methamine   • Wedge compression fracture of unspecified lumbar vertebra, subsequent encounter for fracture with delayed healing        Past Surgical History: moist.   Lungs: Clear to auscultation bilaterally with no rales, no retractions, and no wheezing. HEART: Irregular rate and rhythm. S1 and S2. No murmurs, no rubs or gallops. ABDOMEN: Soft, nontender and nondistended. Vertical scar noted on abdomen.   [facility]       ED Course as of Feb 09 0142  ------------------------------------------------------------   EKG    Rate, intervals and axes as noted on EKG Report. Rate: 70  Rhythm: Atrial Fibrillation  Reading: Poor R-wave progression. Low voltage.

## 2018-02-09 ENCOUNTER — APPOINTMENT (OUTPATIENT)
Dept: GENERAL RADIOLOGY | Facility: HOSPITAL | Age: 83
End: 2018-02-09
Attending: HOSPITALIST
Payer: MEDICARE

## 2018-02-09 LAB
ATRIAL RATE: 178 BPM
BASOPHILS # BLD AUTO: 0.03 X10(3) UL (ref 0–0.1)
BASOPHILS NFR BLD AUTO: 0.7 %
BUN BLD-MCNC: 4 MG/DL (ref 8–20)
CALCIUM BLD-MCNC: 8.6 MG/DL (ref 8.3–10.3)
CHLORIDE: 105 MMOL/L (ref 101–111)
CO2: 28 MMOL/L (ref 22–32)
CREAT BLD-MCNC: 0.5 MG/DL (ref 0.55–1.02)
EOSINOPHIL # BLD AUTO: 0.23 X10(3) UL (ref 0–0.3)
EOSINOPHIL NFR BLD AUTO: 5.6 %
ERYTHROCYTE [DISTWIDTH] IN BLOOD BY AUTOMATED COUNT: 15.9 % (ref 11.5–16)
GLUCOSE BLD-MCNC: 90 MG/DL (ref 70–99)
HCT VFR BLD AUTO: 33.6 % (ref 34–50)
HGB BLD-MCNC: 11.5 G/DL (ref 12–16)
IMMATURE GRANULOCYTE COUNT: 0 X10(3) UL (ref 0–1)
IMMATURE GRANULOCYTE RATIO %: 0 %
INR BLD: 2.93 (ref 0.89–1.11)
LYMPHOCYTES # BLD AUTO: 1.4 X10(3) UL (ref 0.9–4)
LYMPHOCYTES NFR BLD AUTO: 33.9 %
MCH RBC QN AUTO: 29.8 PG (ref 27–33.2)
MCHC RBC AUTO-ENTMCNC: 34.2 G/DL (ref 31–37)
MCV RBC AUTO: 87 FL (ref 81–100)
MONOCYTES # BLD AUTO: 0.43 X10(3) UL (ref 0.1–1)
MONOCYTES NFR BLD AUTO: 10.4 %
NEUTROPHIL ABS PRELIM: 2.04 X10 (3) UL (ref 1.3–6.7)
NEUTROPHILS # BLD AUTO: 2.04 X10(3) UL (ref 1.3–6.7)
NEUTROPHILS NFR BLD AUTO: 49.4 %
PLATELET # BLD AUTO: 232 10(3)UL (ref 150–450)
POTASSIUM SERPL-SCNC: 3 MMOL/L (ref 3.6–5.1)
POTASSIUM SERPL-SCNC: 3.6 MMOL/L (ref 3.6–5.1)
PSA SERPL DL<=0.01 NG/ML-MCNC: 31.2 SECONDS (ref 12–14.3)
Q-T INTERVAL: 304 MS
QRS DURATION: 88 MS
QTC CALCULATION (BEZET): 328 MS
R AXIS: -57 DEGREES
RBC # BLD AUTO: 3.86 X10(6)UL (ref 3.8–5.1)
RED CELL DISTRIBUTION WIDTH-SD: 50.6 FL (ref 35.1–46.3)
SODIUM SERPL-SCNC: 142 MMOL/L (ref 136–144)
T AXIS: -69 DEGREES
VENTRICULAR RATE: 70 BPM
WBC # BLD AUTO: 4.1 X10(3) UL (ref 4–13)

## 2018-02-09 PROCEDURE — 87427 SHIGA-LIKE TOXIN AG IA: CPT | Performed by: EMERGENCY MEDICINE

## 2018-02-09 PROCEDURE — 97116 GAIT TRAINING THERAPY: CPT

## 2018-02-09 PROCEDURE — 87077 CULTURE AEROBIC IDENTIFY: CPT | Performed by: EMERGENCY MEDICINE

## 2018-02-09 PROCEDURE — 87045 FECES CULTURE AEROBIC BACT: CPT | Performed by: EMERGENCY MEDICINE

## 2018-02-09 PROCEDURE — 97161 PT EVAL LOW COMPLEX 20 MIN: CPT

## 2018-02-09 PROCEDURE — 80048 BASIC METABOLIC PNL TOTAL CA: CPT | Performed by: HOSPITALIST

## 2018-02-09 PROCEDURE — 96361 HYDRATE IV INFUSION ADD-ON: CPT

## 2018-02-09 PROCEDURE — 72220 X-RAY EXAM SACRUM TAILBONE: CPT | Performed by: HOSPITALIST

## 2018-02-09 PROCEDURE — 85025 COMPLETE CBC W/AUTO DIFF WBC: CPT | Performed by: HOSPITALIST

## 2018-02-09 PROCEDURE — 96376 TX/PRO/DX INJ SAME DRUG ADON: CPT

## 2018-02-09 PROCEDURE — 85610 PROTHROMBIN TIME: CPT | Performed by: HOSPITALIST

## 2018-02-09 PROCEDURE — 87046 STOOL CULTR AEROBIC BACT EA: CPT | Performed by: EMERGENCY MEDICINE

## 2018-02-09 PROCEDURE — 97166 OT EVAL MOD COMPLEX 45 MIN: CPT

## 2018-02-09 PROCEDURE — 82272 OCCULT BLD FECES 1-3 TESTS: CPT | Performed by: EMERGENCY MEDICINE

## 2018-02-09 PROCEDURE — 96366 THER/PROPH/DIAG IV INF ADDON: CPT

## 2018-02-09 PROCEDURE — 87493 C DIFF AMPLIFIED PROBE: CPT | Performed by: EMERGENCY MEDICINE

## 2018-02-09 PROCEDURE — 97535 SELF CARE MNGMENT TRAINING: CPT

## 2018-02-09 PROCEDURE — 84132 ASSAY OF SERUM POTASSIUM: CPT | Performed by: HOSPITALIST

## 2018-02-09 RX ORDER — POTASSIUM CHLORIDE 14.9 MG/ML
20 INJECTION INTRAVENOUS ONCE
Status: COMPLETED | OUTPATIENT
Start: 2018-02-09 | End: 2018-02-10

## 2018-02-09 RX ORDER — WARFARIN SODIUM 2 MG/1
2 TABLET ORAL
Status: COMPLETED | OUTPATIENT
Start: 2018-02-09 | End: 2018-02-09

## 2018-02-09 RX ORDER — POTASSIUM CHLORIDE 20 MEQ/1
40 TABLET, EXTENDED RELEASE ORAL EVERY 4 HOURS
Status: COMPLETED | OUTPATIENT
Start: 2018-02-10 | End: 2018-02-10

## 2018-02-09 NOTE — PHYSICAL THERAPY NOTE
PHYSICAL THERAPY EVALUATION - INPATIENT     Room Number: 405/405-A  Evaluation Date: 2/9/2018  Type of Evaluation: Initial  Physician Order: PT Eval and Treat    Presenting Problem: Hypokalemia, generalized weakness and diarrhea  Reason for Therapy: • Neuropathy     Unclear Etiology   • Recurrent UTI     on Daily oral Cefuroxime and Methamine   • Wedge compression fracture of unspecified lumbar vertebra, subsequent encounter for fracture with delayed healing        Past Surgical History  Past Surgic fall risk    WEIGHT BEARING RESTRICTION  Weight Bearing Restriction: None                PAIN ASSESSMENT  Ratin  Location: denies pn       COGNITION  · Overall Cognitive Status:  WFL - within functional limits  · Arousal/Alertness:  appropriate respons actual CGA)  Distance (ft): 200  Assistive Device: Rolling walker  Pattern: Shuffle;L Flexed knee;R Flexed knee;Comment (short shuffled pattern)  Stoop/Curb Assistance: Not tested       Skilled Therapy Provided: Pt presents to PT lying supine in bed.  Pt is skilled inpatient PT to address the above deficits to assist patient in returning to prior to level of function. Rec DC back to care home c PT/OT when medically appropriate.    DISCHARGE RECOMMENDATIONS  PT Discharge Recommendations: Assisted living with PT/OT

## 2018-02-09 NOTE — OCCUPATIONAL THERAPY NOTE
OCCUPATIONAL THERAPY EVALUATION - INPATIENT     Room Number: 405/405-A  Evaluation Date: 2/9/2018  Type of Evaluation: Initial  Presenting Problem: weakness, hypokalemia and diarrhea     Physician Order: IP Consult to Occupational Therapy  Reason for [facility] Wicho pacemaker  1999: CHOLECYSTECTOMY      Comment: removal via scope  1987: CYSTOURETHROSCOPY      Comment: [facility]  11-1-2011: CYSTOURETHROSCOPY      Comment: Cystoscopy - dr. Eugene Bliss  4/17/13: CYSTOURETHROSCOPY      Comment: cysto - Dr. Eugene Bliss  [facility] BEARING RESTRICTION  Weight Bearing Restriction: None                PAIN ASSESSMENT  Ratin  Location: N/A       COGNITION  Memory:  decreased short term memory    VISION  Current Vision: wears glasses only for reading        [facility] assist and min cues for RW use and hand placement. Pt was educated on safety precautions. Pt was left in her chair with questions answered, needs met and call light within reach. Pt's RN/PCT was made aware of pt's present position and condition.    Patient Assisted living with PT/OT  OT Device Recommendations: TBD    PLAN  OT Treatment Plan: Balance activities; Energy conservation/work simplification techniques;ADL training;Functional transfer training; Endurance training;Patient/Family education;Patient/Famil

## 2018-02-09 NOTE — ED NOTES
Pt resting and comfortable. Awake and alert. No acute discomfort. Awaiting to go to ct department. Pt placed on contact isolation and pt is aware, verbalizing understanding.

## 2018-02-09 NOTE — PROGRESS NOTES
[facility] Hospitalist Progress Note                                                                   April  11/8/1931    CC: f/u abd pain    SUBJECTIVE:    Pt seen in AM. States no nause mg Oral Nightly   • digoxin  125 mcg Oral Daily   • docusate sodium  100 mg Oral BID   • famoTIDine  20 mg Oral Daily   • cholestyramine light  4 g Oral Daily   • Fluticasone Furoate-Vilanterol  1 puff Inhalation Daily     Continuous Infusions:   • sodium

## 2018-02-09 NOTE — CONSULTS
Pharmacy Dosing Service: Warfarin (Coumadin)    Danial Mathew is a 80year old female for whom pharmacy has been dosing warfarin (Coumadin).  Goal INR is 2-3    Recent Labs   Lab  02/08/18   1606  02/09/18   0629   INR  2.95*  2.93*     Potential Drug Inte

## 2018-02-09 NOTE — CM/SW NOTE
02/09/18 1000   CM/SW Screening   Referral Source    Information Source Chart review  (son)   Patient's Mental Status Alert;Oriented   Patient's [address] Name [place] Indep   Number of Levels in Home 1

## 2018-02-09 NOTE — PLAN OF CARE
Dehydration    • Interventions for Selected Goals Progressing        Impaired Activities of Daily Living    • Achieve highest/safest level of independence in self care Progressing        Patient/Family Goals    • Patient/Family Long Term Goal Progressing

## 2018-02-09 NOTE — PROGRESS NOTES
[facility] Dosing Service  Warfarin (Coumadin) Initial Dosing    Dali Tovar is a 80year old female for whom pharmacy has been consulted to dose warfarin (COUMADIN) for Afib/flutter  by Dr. Curry Vega. Based on this indication, goal INR is 2-3.     Annie Gillis

## 2018-02-09 NOTE — H&P
[facility] Hospitalist History and Physical      Patient presents with:  Nausea/Vomiting/Diarrhea (gastrointestinal)  Back Pain (musculoskeletal)       PCP: Hansa Valladares MD      History of Present Illness: Patient is a 80year old female with PMH sig for asthma, - dr. Soto Factor  4/17/13: CYSTOURETHROSCOPY      Comment: cysto - Dr. Soto Factor  4/22/15: CYSTOURETHROSCOPY      Comment: Flow US, Cystoscopy- Dr. Soto Factor  2000: HERNIA SURGERY      Comment: repair  1979: HYSTERECTOMY      Comment: 1 ovary left  1954: OTHER SURGICAL HISTO 02/08/2018   INR 2.95 02/08/2018   PTP 31.4 02/08/2018       CXR: image personally reviewed.       Radiology: Xr Sacrum + Coccyx (min 2 Views) (cpt=72220)    Result Date: 2/2/2018  SACRUM AND COCCYX AP AND LATERAL HISTORY/INDICATION:  Pain FINDINGS: There i Patient has had diarrhea for 4 days. CONTRAST USED:  62cc of Omnipaque 350  FINDINGS:  LIVER:  Enlargement of liver with a slightly nodular contour which may represent cirrhosis. The craniocaudad dimension is 20.5 cm.  BILIARY:  Status post cholecystecto CONCLUSION:  1. Again noted is hepatomegaly and splenomegaly with numerous varices consistent with portal venous hypertension. 2.  Extensive left colon and sigmoid colon diverticulosis without acute diverticulitis. No definite bowel obstruction.   No

## 2018-02-09 NOTE — PROGRESS NOTES
NURSING ADMISSION NOTE      Patient admitted via Cart  Oriented to room. Safety precautions initiated. Bed in low position. Call light in reach. Pt A&O times 3, forgetful. Unable to recall home meds.   Writer called son POA, will bring list in to

## 2018-02-10 LAB
INR BLD: 2.71 (ref 0.89–1.11)
POTASSIUM SERPL-SCNC: 4.6 MMOL/L (ref 3.6–5.1)
PSA SERPL DL<=0.01 NG/ML-MCNC: 29.3 SECONDS (ref 12–14.3)

## 2018-02-10 PROCEDURE — 85610 PROTHROMBIN TIME: CPT | Performed by: HOSPITALIST

## 2018-02-10 PROCEDURE — 84132 ASSAY OF SERUM POTASSIUM: CPT | Performed by: HOSPITALIST

## 2018-02-10 RX ORDER — TRIAMTERENE AND HYDROCHLOROTHIAZIDE 37.5; 25 MG/1; MG/1
1 CAPSULE ORAL EVERY MORNING
Status: DISCONTINUED | OUTPATIENT
Start: 2018-02-11 | End: 2018-02-11

## 2018-02-10 NOTE — PLAN OF CARE
Dehydration    • Interventions for Selected Goals Progressing        Impaired Activities of Daily Living    • Achieve highest/safest level of independence in self care Progressing        Impaired Functional Mobility    • Achieve highest/safest level of mob

## 2018-02-10 NOTE — PROGRESS NOTES
Visiting with . k checked. patient refused iv replacement, oral k given. Restless at start of shift. Did not want to be disturbed.

## 2018-02-10 NOTE — PROGRESS NOTES
[facility] Hospitalist Progress Note                                                                   April  11/8/1931    CC: f/u abd pain    SUBJECTIVE:  Patient feeling better  Stools s Oral BID   • Amitriptyline HCl  50 mg Oral Nightly   • DilTIAZem HCl ER Coated Beads  180 mg Oral Daily   • Donepezil HCl  10 mg Oral Nightly   • digoxin  125 mcg Oral Daily   • docusate sodium  100 mg Oral BID   • famoTIDine  20 mg Oral Daily   • cholesty

## 2018-02-10 NOTE — PROGRESS NOTES
120 [facility] Dosing Service  Warfarin (Coumadin) Subsequent Dosing    Porter Meza is a 80year old female for whom pharmacy has been dosing warfarin (Coumadin).  Goal INR is 2-3    Recent Labs   Lab  02/08/18   1606  02/09/18   0629  02/10/18   0953   IN

## 2018-02-10 NOTE — PLAN OF CARE
Dr Julia Meléndez informed RN both patient & spouse are pretty stable to be discharged today. Son spoke to RN previously & informed that there is no caregiver arranged for today & he'd rather patient be discharged tomorrow if okay with the doctor. Dr Blas Philip

## 2018-02-11 VITALS
OXYGEN SATURATION: 97 % | SYSTOLIC BLOOD PRESSURE: 157 MMHG | WEIGHT: 116.38 LBS | BODY MASS INDEX: 18.7 KG/M2 | HEIGHT: 66 IN | HEART RATE: 66 BPM | TEMPERATURE: 98 F | DIASTOLIC BLOOD PRESSURE: 74 MMHG | RESPIRATION RATE: 18 BRPM

## 2018-02-11 LAB
INR BLD: 2.38 (ref 0.89–1.11)
PSA SERPL DL<=0.01 NG/ML-MCNC: 26.4 SECONDS (ref 12–14.3)

## 2018-02-11 PROCEDURE — 85610 PROTHROMBIN TIME: CPT | Performed by: HOSPITALIST

## 2018-02-11 RX ORDER — WARFARIN SODIUM 2.5 MG/1
2.5 TABLET ORAL
Status: DISCONTINUED | OUTPATIENT
Start: 2018-02-11 | End: 2018-02-11

## 2018-02-11 NOTE — PLAN OF CARE
NURSING DISCHARGE NOTE    Discharged Home via Wheelchair. Accompanied by Support staff  Belongings Taken by patient/family. Son Rufina Kidd here to take pt and her  back to independent living at [facility]. DC instructions given and explained.  Triston Pierce

## 2018-02-11 NOTE — CM/SW NOTE
RN states pt is ready for discharge. SW notified [facility] of pt's discharge to [address] [place] [address]. Pt's son to transport pt.      RENETTA Krueger

## 2018-02-11 NOTE — DISCHARGE SUMMARY
General Medicine Discharge Summary     Patient ID:  Salvador Do  80year old  11/8/1931    Admit date: 2/8/2018    Discharge date and time: 2/11/2018 12:22 PM     Attending Physician: Selvin Wall MD    Primary Care Physician: Ac Pacheco MD     Re CHANGED    Amitriptyline HCl 50 MG Oral Tab  Take 50 mg by mouth nightly., Historical    DilTIAZem HCl ER Coated Beads (CARTIA XT) 180 MG Oral Capsule SR 24 Hr  Take 180 mg by mouth daily. , Historical    Donepezil HCl 10 MG Oral Tab  Take 10 mg by mouth ni R-5    Montelukast Sodium 10 MG Oral Tab  Take 10 mg by mouth nightly., Historical    Warfarin Sodium 2 MG Oral Tab  Take 3.5 mg by mouth 3 (three) times a week.  Coumadin 3.5 mg po Q Mon, Wed, Fri and 2 mg nightly for the rest of the week , Historical    g

## 2018-02-11 NOTE — PROGRESS NOTES
120 [facility] Dosing Service  Warfarin (Coumadin) Subsequent Dosing    Marybeth Gerardo is a 80year old female for whom pharmacy has been dosing warfarin (Coumadin).  Goal INR is 2-3    Recent Labs   Lab  02/08/18   1606  02/09/18   0629  02/10/18   0953  02/

## 2018-02-11 NOTE — PLAN OF CARE
No acute change overnight. Saline lock . Up to bedside commode with large soft BM. Slept well.  Denies pain

## 2018-02-17 ENCOUNTER — HOSPITAL ENCOUNTER (EMERGENCY)
Facility: HOSPITAL | Age: 83
Discharge: HOME | End: 2018-02-17
Attending: EMERGENCY MEDICINE
Payer: MEDICARE

## 2018-02-17 ENCOUNTER — APPOINTMENT (OUTPATIENT)
Dept: CT IMAGING | Facility: HOSPITAL | Age: 83
End: 2018-02-17
Attending: EMERGENCY MEDICINE
Payer: MEDICARE

## 2018-02-17 VITALS
BODY MASS INDEX: 17.99 KG/M2 | SYSTOLIC BLOOD PRESSURE: 129 MMHG | WEIGHT: 114.63 LBS | TEMPERATURE: 99 F | HEART RATE: 66 BPM | DIASTOLIC BLOOD PRESSURE: 71 MMHG | RESPIRATION RATE: 18 BRPM | HEIGHT: 67 IN | OXYGEN SATURATION: 96 %

## 2018-02-17 DIAGNOSIS — R53.1 WEAKNESS GENERALIZED: Primary | ICD-10-CM

## 2018-02-17 LAB
ALBUMIN SERPL-MCNC: 3.5 G/DL (ref 3.5–4.8)
ALP LIVER SERPL-CCNC: 151 U/L (ref 55–142)
ALT SERPL-CCNC: 19 U/L (ref 14–54)
AST SERPL-CCNC: 24 U/L (ref 15–41)
BASOPHILS # BLD AUTO: 0.05 X10(3) UL (ref 0–0.1)
BASOPHILS NFR BLD AUTO: 0.7 %
BILIRUB SERPL-MCNC: 0.9 MG/DL (ref 0.1–2)
BILIRUB UR QL STRIP.AUTO: NEGATIVE
BUN BLD-MCNC: 21 MG/DL (ref 8–20)
CALCIUM BLD-MCNC: 9.3 MG/DL (ref 8.3–10.3)
CHLORIDE: 97 MMOL/L (ref 101–111)
CO2: 29 MMOL/L (ref 22–32)
COLOR UR AUTO: YELLOW
CREAT BLD-MCNC: 0.86 MG/DL (ref 0.55–1.02)
EOSINOPHIL # BLD AUTO: 0.17 X10(3) UL (ref 0–0.3)
EOSINOPHIL NFR BLD AUTO: 2.4 %
ERYTHROCYTE [DISTWIDTH] IN BLOOD BY AUTOMATED COUNT: 15.4 % (ref 11.5–16)
GLUCOSE BLD-MCNC: 132 MG/DL (ref 70–99)
GLUCOSE UR STRIP.AUTO-MCNC: NEGATIVE MG/DL
HCT VFR BLD AUTO: 39.5 % (ref 34–50)
HGB BLD-MCNC: 13.3 G/DL (ref 12–16)
IMMATURE GRANULOCYTE COUNT: 0.03 X10(3) UL (ref 0–1)
IMMATURE GRANULOCYTE RATIO %: 0.4 %
INR BLD: 2.81 (ref 0.89–1.11)
KETONES UR STRIP.AUTO-MCNC: NEGATIVE MG/DL
LEUKOCYTE ESTERASE UR QL STRIP.AUTO: NEGATIVE
LYMPHOCYTES # BLD AUTO: 1.35 X10(3) UL (ref 0.9–4)
LYMPHOCYTES NFR BLD AUTO: 19.3 %
M PROTEIN MFR SERPL ELPH: 7.6 G/DL (ref 6.1–8.3)
MCH RBC QN AUTO: 28.7 PG (ref 27–33.2)
MCHC RBC AUTO-ENTMCNC: 33.7 G/DL (ref 31–37)
MCV RBC AUTO: 85.1 FL (ref 81–100)
MONOCYTES # BLD AUTO: 0.55 X10(3) UL (ref 0.1–1)
MONOCYTES NFR BLD AUTO: 7.9 %
NEUTROPHIL ABS PRELIM: 4.83 X10 (3) UL (ref 1.3–6.7)
NEUTROPHILS # BLD AUTO: 4.83 X10(3) UL (ref 1.3–6.7)
NEUTROPHILS NFR BLD AUTO: 69.3 %
NITRITE UR QL STRIP.AUTO: NEGATIVE
PH UR STRIP.AUTO: 6 [PH] (ref 4.5–8)
PLATELET # BLD AUTO: 328 10(3)UL (ref 150–450)
POTASSIUM SERPL-SCNC: 3.7 MMOL/L (ref 3.6–5.1)
PROT UR STRIP.AUTO-MCNC: NEGATIVE MG/DL
PSA SERPL DL<=0.01 NG/ML-MCNC: 30.2 SECONDS (ref 12–14.3)
RBC # BLD AUTO: 4.64 X10(6)UL (ref 3.8–5.1)
RBC UR QL AUTO: NEGATIVE
RED CELL DISTRIBUTION WIDTH-SD: 47.8 FL (ref 35.1–46.3)
SODIUM SERPL-SCNC: 134 MMOL/L (ref 136–144)
SP GR UR STRIP.AUTO: 1.01 (ref 1–1.03)
UROBILINOGEN UR STRIP.AUTO-MCNC: <2 MG/DL
WBC # BLD AUTO: 7 X10(3) UL (ref 4–13)

## 2018-02-17 PROCEDURE — 80053 COMPREHEN METABOLIC PANEL: CPT

## 2018-02-17 PROCEDURE — 85025 COMPLETE CBC W/AUTO DIFF WBC: CPT

## 2018-02-17 PROCEDURE — 81003 URINALYSIS AUTO W/O SCOPE: CPT | Performed by: EMERGENCY MEDICINE

## 2018-02-17 PROCEDURE — 93010 ELECTROCARDIOGRAM REPORT: CPT

## 2018-02-17 PROCEDURE — 85610 PROTHROMBIN TIME: CPT | Performed by: EMERGENCY MEDICINE

## 2018-02-17 PROCEDURE — 85025 COMPLETE CBC W/AUTO DIFF WBC: CPT | Performed by: EMERGENCY MEDICINE

## 2018-02-17 PROCEDURE — 36415 COLL VENOUS BLD VENIPUNCTURE: CPT

## 2018-02-17 PROCEDURE — 99285 EMERGENCY DEPT VISIT HI MDM: CPT

## 2018-02-17 PROCEDURE — 70450 CT HEAD/BRAIN W/O DYE: CPT | Performed by: EMERGENCY MEDICINE

## 2018-02-17 PROCEDURE — 81003 URINALYSIS AUTO W/O SCOPE: CPT

## 2018-02-17 PROCEDURE — 80053 COMPREHEN METABOLIC PANEL: CPT | Performed by: EMERGENCY MEDICINE

## 2018-02-17 PROCEDURE — 93005 ELECTROCARDIOGRAM TRACING: CPT

## 2018-02-17 NOTE — ED NOTES
Pt was cleared for discharge to [facility]. Pat's via [address]. Pt's son is at bedside and has been updated. Report to be given to [facility]. Pat's RN staff or nursing supervisor.

## 2018-02-17 NOTE — ED NOTES
Son arrives and states that he is concerned with her increased weakness, but he states that she is very confused today. She is alert and oriented x3, but her son says that they were having conversations today and Stephen Maddox was not making sense. \" He states alex

## 2018-02-17 NOTE — ED PROVIDER NOTES
Patient Seen in: [facility] Emergency Department    History   Patient presents with:  Altered Mental Status (neurologic)  Fatigue (constitutional, neurologic)  Numbness Weakness (neurologic)    Stated Complaint:     HPI    77-year-old female was bro PLACEMENT      Comment: [employer] pacemaker  1999: CHOLECYSTECTOMY      Comment: removal via scope  1987: CYSTOURETHROSCOPY      Comment: [facility]  11-1-2011: CYSTOURETHROSCOPY      Comment: Cystoscopy - dr. Matias Bowden  4/17/13: CYSTOURETHROSCOPY      Comment: cysto - D Abnormal; Notable for the following:        Result Value    Clarity Urine Hazy (*)     All other components within normal limits   COMP METABOLIC PANEL (14) - Abnormal; Notable for the following:     Glucose 132 (*)     BUN 21 (*)     Alkaline Phosphatase patient go there for subacute rehab that he will pay for. Test results and treatment plan were discussed prior to disposition.  was involved.        MDM               Disposition and Plan     Clinical Impression:  Weakness generalized  (prim

## 2018-02-17 NOTE — ED NOTES
Per , Fransisca Vo, we are waiting to hear back from [facility]'s in regards to bed availability and possible transfer of patient to their facility.

## 2018-02-17 NOTE — ED INITIAL ASSESSMENT (HPI)
Pt had L1 L2 fx early December. Since then, she has had generalized weakness. She noticed n/t to her legs today, which she gets intermittently.  She was admitted here 2/8-2/11 for generalized weakness, but not diagnosed with anything to explain her complain

## 2018-02-17 NOTE — ED NOTES
Pt up to bedside commode with one person assist. Pt rested head on bed while sitting on commode d/t fatigue.

## 2018-02-17 NOTE — ED NOTES
[facility] ambulance was contacted for transport to [facility]. Pat's, given ETA of 2.5 hr. Ambulance service to attempt other ambulance service and will call back shortly.

## 2018-02-18 LAB
ATRIAL RATE: 58 BPM
Q-T INTERVAL: 496 MS
QRS DURATION: 72 MS
QTC CALCULATION (BEZET): 482 MS
R AXIS: -57 DEGREES
T AXIS: 186 DEGREES
VENTRICULAR RATE: 57 BPM

## 2018-02-18 NOTE — ED NOTES
[facility] service was contacted for [address] [facility] car transport but was declined, was informed that \"no [address] car service was available at this time, and pt does not qualify for ambulance necessity. \" THE [facility] ambulance is still pending arrival.

## 2018-02-18 NOTE — ED NOTES
Pt provided with crackers and water per pt request. Pt has no complaints, vitals remain stable, [facility] transport is still pending.

## 2018-02-18 NOTE — ED NOTES
Called [facility] to follow up on eta. Dispatcher let us know that 2 of the units were placed on a bariatric call which has pushed her back. ..  Approximately 45 mins from now because transport is only taking the bariatric call 7 mins away

## 2018-02-18 NOTE — ED NOTES
[facility] service was re-contacted for updste on ETA of medi car. Dispatcher reported that at this time ETA was \"approximately 1 hour from now\". Pt continues to be without complaints or distress. Vitals remain stable.  Food and beverage offered/dec

## 2018-02-18 NOTE — ED NOTES
Called [facility] again to follow up she let me know that they arrived at their location and are dropping off the patient and will be back within the 45 min time frame they had given us earlier

## 2018-02-18 NOTE — CM/SW NOTE
Asked to see patient who requires more care than can be provided at [facility]. Met with patient and son. Ms. Marybeth Steiner had recent 3 night admission that was Observation. She was also at [address] [place] [address] but discharged just over 30 days ago on 2/11.      Brandon Camarena

## 2018-03-19 ENCOUNTER — APPOINTMENT (OUTPATIENT)
Dept: CT IMAGING | Facility: HOSPITAL | Age: 83
End: 2018-03-19
Attending: EMERGENCY MEDICINE
Payer: MEDICARE

## 2018-03-19 ENCOUNTER — APPOINTMENT (OUTPATIENT)
Dept: GENERAL RADIOLOGY | Facility: HOSPITAL | Age: 83
End: 2018-03-19
Attending: EMERGENCY MEDICINE
Payer: MEDICARE

## 2018-03-19 ENCOUNTER — HOSPITAL ENCOUNTER (OUTPATIENT)
Facility: HOSPITAL | Age: 83
Setting detail: OBSERVATION
Discharge: HOME | End: 2018-03-21
Attending: EMERGENCY MEDICINE | Admitting: HOSPITALIST
Payer: MEDICARE

## 2018-03-19 DIAGNOSIS — R40.0 SOMNOLENCE: Primary | ICD-10-CM

## 2018-03-19 DIAGNOSIS — R53.1 WEAKNESS: ICD-10-CM

## 2018-03-19 LAB
ALBUMIN SERPL-MCNC: 3.1 G/DL (ref 3.5–4.8)
ALP LIVER SERPL-CCNC: 132 U/L (ref 55–142)
ALT SERPL-CCNC: 21 U/L (ref 14–54)
APTT PPP: 39.4 SECONDS (ref 25–34)
AST SERPL-CCNC: 18 U/L (ref 15–41)
ATRIAL RATE: 69 BPM
BASOPHILS # BLD AUTO: 0.02 X10(3) UL (ref 0–0.1)
BASOPHILS NFR BLD AUTO: 0.4 %
BILIRUB SERPL-MCNC: 0.4 MG/DL (ref 0.1–2)
BILIRUB UR QL STRIP.AUTO: NEGATIVE
BUN BLD-MCNC: 8 MG/DL (ref 8–20)
CALCIUM BLD-MCNC: 9 MG/DL (ref 8.3–10.3)
CHLORIDE: 102 MMOL/L (ref 101–111)
CLARITY UR REFRACT.AUTO: CLEAR
CO2: 31 MMOL/L (ref 22–32)
COLOR UR AUTO: YELLOW
CREAT BLD-MCNC: 0.71 MG/DL (ref 0.55–1.02)
DIGOXIN: 1.06 NG/ML (ref 0.8–2)
EOSINOPHIL # BLD AUTO: 0.19 X10(3) UL (ref 0–0.3)
EOSINOPHIL NFR BLD AUTO: 3.4 %
ERYTHROCYTE [DISTWIDTH] IN BLOOD BY AUTOMATED COUNT: 15 % (ref 11.5–16)
GLUCOSE BLD-MCNC: 99 MG/DL (ref 70–99)
GLUCOSE UR STRIP.AUTO-MCNC: NEGATIVE MG/DL
HAV IGM SER QL: 1.9 MG/DL (ref 1.7–3)
HCT VFR BLD AUTO: 36.2 % (ref 34–50)
HGB BLD-MCNC: 12.3 G/DL (ref 12–16)
IMMATURE GRANULOCYTE COUNT: 0.01 X10(3) UL (ref 0–1)
IMMATURE GRANULOCYTE RATIO %: 0.2 %
INR BLD: 1.82 (ref 0.9–1.1)
KETONES UR STRIP.AUTO-MCNC: NEGATIVE MG/DL
LEUKOCYTE ESTERASE UR QL STRIP.AUTO: NEGATIVE
LYMPHOCYTES # BLD AUTO: 1.61 X10(3) UL (ref 0.9–4)
LYMPHOCYTES NFR BLD AUTO: 29.2 %
M PROTEIN MFR SERPL ELPH: 6.8 G/DL (ref 6.1–8.3)
MCH RBC QN AUTO: 29.7 PG (ref 27–33.2)
MCHC RBC AUTO-ENTMCNC: 34 G/DL (ref 31–37)
MCV RBC AUTO: 87.4 FL (ref 81–100)
MONOCYTES # BLD AUTO: 0.51 X10(3) UL (ref 0.1–1)
MONOCYTES NFR BLD AUTO: 9.2 %
NEUTROPHIL ABS PRELIM: 3.18 X10 (3) UL (ref 1.3–6.7)
NEUTROPHILS # BLD AUTO: 3.18 X10(3) UL (ref 1.3–6.7)
NEUTROPHILS NFR BLD AUTO: 57.6 %
NITRITE UR QL STRIP.AUTO: NEGATIVE
PH UR STRIP.AUTO: 7 [PH] (ref 4.5–8)
PLATELET # BLD AUTO: 209 10(3)UL (ref 150–450)
POTASSIUM SERPL-SCNC: 3.2 MMOL/L (ref 3.6–5.1)
PROT UR STRIP.AUTO-MCNC: NEGATIVE MG/DL
PSA SERPL DL<=0.01 NG/ML-MCNC: 21.7 SECONDS (ref 12.4–14.7)
Q-T INTERVAL: 388 MS
QRS DURATION: 84 MS
QTC CALCULATION (BEZET): 433 MS
R AXIS: -53 DEGREES
RBC # BLD AUTO: 4.14 X10(6)UL (ref 3.8–5.1)
RBC UR QL AUTO: NEGATIVE
RED CELL DISTRIBUTION WIDTH-SD: 48.1 FL (ref 35.1–46.3)
SODIUM SERPL-SCNC: 140 MMOL/L (ref 136–144)
SP GR UR STRIP.AUTO: 1.01 (ref 1–1.03)
T AXIS: -34 DEGREES
TROPONIN: <0.046 NG/ML (ref ?–0.05)
UROBILINOGEN UR STRIP.AUTO-MCNC: <2 MG/DL
VENTRICULAR RATE: 75 BPM
WBC # BLD AUTO: 5.5 X10(3) UL (ref 4–13)

## 2018-03-19 PROCEDURE — 81003 URINALYSIS AUTO W/O SCOPE: CPT | Performed by: EMERGENCY MEDICINE

## 2018-03-19 PROCEDURE — 99285 EMERGENCY DEPT VISIT HI MDM: CPT

## 2018-03-19 PROCEDURE — 70450 CT HEAD/BRAIN W/O DYE: CPT | Performed by: EMERGENCY MEDICINE

## 2018-03-19 PROCEDURE — 84484 ASSAY OF TROPONIN QUANT: CPT

## 2018-03-19 PROCEDURE — 96361 HYDRATE IV INFUSION ADD-ON: CPT

## 2018-03-19 PROCEDURE — 85730 THROMBOPLASTIN TIME PARTIAL: CPT | Performed by: EMERGENCY MEDICINE

## 2018-03-19 PROCEDURE — 85610 PROTHROMBIN TIME: CPT | Performed by: EMERGENCY MEDICINE

## 2018-03-19 PROCEDURE — 96365 THER/PROPH/DIAG IV INF INIT: CPT

## 2018-03-19 PROCEDURE — 80053 COMPREHEN METABOLIC PANEL: CPT

## 2018-03-19 PROCEDURE — 96366 THER/PROPH/DIAG IV INF ADDON: CPT

## 2018-03-19 PROCEDURE — 80053 COMPREHEN METABOLIC PANEL: CPT | Performed by: EMERGENCY MEDICINE

## 2018-03-19 PROCEDURE — 83735 ASSAY OF MAGNESIUM: CPT | Performed by: EMERGENCY MEDICINE

## 2018-03-19 PROCEDURE — 85025 COMPLETE CBC W/AUTO DIFF WBC: CPT

## 2018-03-19 PROCEDURE — 93005 ELECTROCARDIOGRAM TRACING: CPT

## 2018-03-19 PROCEDURE — 80162 ASSAY OF DIGOXIN TOTAL: CPT | Performed by: EMERGENCY MEDICINE

## 2018-03-19 PROCEDURE — 93010 ELECTROCARDIOGRAM REPORT: CPT

## 2018-03-19 PROCEDURE — 85025 COMPLETE CBC W/AUTO DIFF WBC: CPT | Performed by: EMERGENCY MEDICINE

## 2018-03-19 PROCEDURE — 84484 ASSAY OF TROPONIN QUANT: CPT | Performed by: EMERGENCY MEDICINE

## 2018-03-19 PROCEDURE — 71045 X-RAY EXAM CHEST 1 VIEW: CPT | Performed by: EMERGENCY MEDICINE

## 2018-03-19 RX ORDER — SODIUM CHLORIDE 9 MG/ML
INJECTION, SOLUTION INTRAVENOUS ONCE
Status: COMPLETED | OUTPATIENT
Start: 2018-03-19 | End: 2018-03-19

## 2018-03-19 RX ORDER — CHOLESTYRAMINE LIGHT 4 G/5.7G
4 POWDER, FOR SUSPENSION ORAL DAILY
Status: DISCONTINUED | OUTPATIENT
Start: 2018-03-19 | End: 2018-03-21

## 2018-03-19 RX ORDER — DICYCLOMINE HCL 20 MG
20 TABLET ORAL 2 TIMES DAILY PRN
Status: DISCONTINUED | OUTPATIENT
Start: 2018-03-19 | End: 2018-03-21

## 2018-03-19 RX ORDER — WARFARIN SODIUM 3 MG/1
3 TABLET ORAL DAILY
COMMUNITY
End: 2018-03-19

## 2018-03-19 RX ORDER — DOCUSATE SODIUM 100 MG/1
100 CAPSULE, LIQUID FILLED ORAL 2 TIMES DAILY
Status: DISCONTINUED | OUTPATIENT
Start: 2018-03-19 | End: 2018-03-21

## 2018-03-19 RX ORDER — LATANOPROST 50 UG/ML
1 SOLUTION/ DROPS OPHTHALMIC NIGHTLY
COMMUNITY

## 2018-03-19 RX ORDER — TRIAMTERENE AND HYDROCHLOROTHIAZIDE 37.5; 25 MG/1; MG/1
1 CAPSULE ORAL EVERY MORNING
Status: DISCONTINUED | OUTPATIENT
Start: 2018-03-20 | End: 2018-03-21

## 2018-03-19 RX ORDER — MONTELUKAST SODIUM 10 MG/1
10 TABLET ORAL NIGHTLY
Status: DISCONTINUED | OUTPATIENT
Start: 2018-03-19 | End: 2018-03-21

## 2018-03-19 RX ORDER — MEMANTINE HYDROCHLORIDE 5 MG/1
5 TABLET ORAL 2 TIMES DAILY
COMMUNITY

## 2018-03-19 RX ORDER — POTASSIUM CHLORIDE 20 MEQ/1
40 TABLET, EXTENDED RELEASE ORAL ONCE
Status: COMPLETED | OUTPATIENT
Start: 2018-03-19 | End: 2018-03-19

## 2018-03-19 RX ORDER — CETIRIZINE HYDROCHLORIDE 10 MG/1
10 TABLET ORAL NIGHTLY
Status: DISCONTINUED | OUTPATIENT
Start: 2018-03-19 | End: 2018-03-21

## 2018-03-19 RX ORDER — DICYCLOMINE HCL 20 MG
20 TABLET ORAL 2 TIMES DAILY PRN
COMMUNITY

## 2018-03-19 RX ORDER — FAMOTIDINE 20 MG/1
20 TABLET ORAL NIGHTLY
Status: DISCONTINUED | OUTPATIENT
Start: 2018-03-19 | End: 2018-03-21

## 2018-03-19 RX ORDER — DONEPEZIL HYDROCHLORIDE 10 MG/1
10 TABLET, FILM COATED ORAL NIGHTLY
Status: DISCONTINUED | OUTPATIENT
Start: 2018-03-19 | End: 2018-03-21

## 2018-03-19 RX ORDER — LATANOPROST 50 UG/ML
1 SOLUTION/ DROPS OPHTHALMIC NIGHTLY
Status: DISCONTINUED | OUTPATIENT
Start: 2018-03-19 | End: 2018-03-21

## 2018-03-19 RX ORDER — ACETAMINOPHEN 325 MG/1
650 TABLET ORAL EVERY 6 HOURS PRN
Status: DISCONTINUED | OUTPATIENT
Start: 2018-03-19 | End: 2018-03-21

## 2018-03-19 RX ORDER — POTASSIUM CHLORIDE 14.9 MG/ML
20 INJECTION INTRAVENOUS ONCE
Status: COMPLETED | OUTPATIENT
Start: 2018-03-19 | End: 2018-03-19

## 2018-03-19 RX ORDER — GABAPENTIN 300 MG/1
600 CAPSULE ORAL NIGHTLY
Status: DISCONTINUED | OUTPATIENT
Start: 2018-03-19 | End: 2018-03-21

## 2018-03-19 RX ORDER — ONDANSETRON 2 MG/ML
4 INJECTION INTRAMUSCULAR; INTRAVENOUS EVERY 6 HOURS PRN
Status: DISCONTINUED | OUTPATIENT
Start: 2018-03-19 | End: 2018-03-21

## 2018-03-19 RX ORDER — DILTIAZEM HYDROCHLORIDE 180 MG/1
180 CAPSULE, EXTENDED RELEASE ORAL DAILY
Status: DISCONTINUED | OUTPATIENT
Start: 2018-03-19 | End: 2018-03-21

## 2018-03-19 RX ORDER — MEMANTINE HYDROCHLORIDE 5 MG/1
5 TABLET ORAL 2 TIMES DAILY
Status: DISCONTINUED | OUTPATIENT
Start: 2018-03-19 | End: 2018-03-21

## 2018-03-19 RX ORDER — MIRTAZAPINE 7.5 MG/1
7.5 TABLET, FILM COATED ORAL NIGHTLY
COMMUNITY

## 2018-03-19 RX ORDER — POLYETHYLENE GLYCOL 3350 17 G/17G
17 POWDER, FOR SOLUTION ORAL DAILY PRN
Status: DISCONTINUED | OUTPATIENT
Start: 2018-03-19 | End: 2018-03-21

## 2018-03-19 RX ORDER — METHENAMINE HIPPURATE 1000 MG/1
1 TABLET ORAL DAILY
Status: DISCONTINUED | OUTPATIENT
Start: 2018-03-20 | End: 2018-03-21

## 2018-03-19 RX ORDER — MIRTAZAPINE 15 MG/1
7.5 TABLET, FILM COATED ORAL NIGHTLY
Status: DISCONTINUED | OUTPATIENT
Start: 2018-03-19 | End: 2018-03-21

## 2018-03-19 RX ORDER — POTASSIUM CHLORIDE 750 MG/1
10 TABLET, EXTENDED RELEASE ORAL DAILY
Status: DISCONTINUED | OUTPATIENT
Start: 2018-03-19 | End: 2018-03-21

## 2018-03-19 RX ORDER — POTASSIUM CHLORIDE 750 MG/1
10 TABLET, EXTENDED RELEASE ORAL DAILY
COMMUNITY

## 2018-03-19 RX ORDER — AMITRIPTYLINE HYDROCHLORIDE 50 MG/1
50 TABLET, FILM COATED ORAL NIGHTLY
Status: DISCONTINUED | OUTPATIENT
Start: 2018-03-19 | End: 2018-03-21

## 2018-03-19 RX ORDER — DIGOXIN 125 MCG
125 TABLET ORAL DAILY
Status: DISCONTINUED | OUTPATIENT
Start: 2018-03-19 | End: 2018-03-21

## 2018-03-19 NOTE — ED INITIAL ASSESSMENT (HPI)
Pt presents to ER with increasing weakness and feeling lethargic. Pt moving all extremities. Speaking clearly. Pt is alert to person and situation. Pt states no pain.  Pt states that after breakfast this morning she felt as thought \"it was hard to stay pat

## 2018-03-19 NOTE — ED PROVIDER NOTES
Patient Seen in: [facility] Emergency Department    History   Patient presents with:  Altered Mental Status (neurologic)    Stated Complaint: lethargic     HPI    extreme somnolence that began at 10:45 am-the patient presents to the emergency depart Lactose intolerance    • Macular degeneration, bilateral    • Neuropathy     Unclear Etiology   • Recurrent UTI     on Daily oral Cefuroxime and Methamine   • Weakness    • Wedge compression fracture of unspecified lumbar vertebra, subsequent encounter for External ear normal.   Nose: Nose normal.   Dry oral mucosa   Eyes: Conjunctivae and EOM are normal. Pupils are equal, round, and reactive to light. Right eye exhibits no discharge. Left eye exhibits no discharge. No scleral icterus.    Neck: Normal range o against 0.3-0.7 heparin anti-Xa units/mL.      CBC W/ DIFFERENTIAL - Abnormal; Notable for the following:     RDW-SD 48.1 (*)     All other components within normal limits   TROPONIN I - Normal   DIGOXIN (LANOXIN) - Normal   MAGNESIUM - Normal   CBC WITH DI chronic small vessel ischemic disease. Ventricles and sulci are appropriate for the patient's age. No mass effect. Visualized portions of paranasal sinuses are unremarkable. Visualized portions of the mastoid air cells are unremarkable.     Vis left-sided weakness at home, patient is still clumsy in her movement and may have a pronator drift on the left toe has global weakness she is already anticoagulated on Coumadin with her history of atrial fibrillation, she does have hypokalemia which can ca

## 2018-03-20 ENCOUNTER — APPOINTMENT (OUTPATIENT)
Dept: ULTRASOUND IMAGING | Facility: HOSPITAL | Age: 83
End: 2018-03-20
Attending: Other
Payer: MEDICARE

## 2018-03-20 LAB
INR BLD: 1.73 (ref 0.9–1.1)
PSA SERPL DL<=0.01 NG/ML-MCNC: 20.9 SECONDS (ref 12.4–14.7)

## 2018-03-20 PROCEDURE — 97116 GAIT TRAINING THERAPY: CPT

## 2018-03-20 PROCEDURE — 85610 PROTHROMBIN TIME: CPT | Performed by: HOSPITALIST

## 2018-03-20 PROCEDURE — 95816 EEG AWAKE AND DROWSY: CPT

## 2018-03-20 PROCEDURE — 93880 EXTRACRANIAL BILAT STUDY: CPT | Performed by: OTHER

## 2018-03-20 PROCEDURE — 97162 PT EVAL MOD COMPLEX 30 MIN: CPT

## 2018-03-20 RX ORDER — WARFARIN SODIUM 2 MG/1
4 TABLET ORAL
Status: COMPLETED | OUTPATIENT
Start: 2018-03-20 | End: 2018-03-20

## 2018-03-20 NOTE — H&P
.  CC: Patient presents with:  Altered Mental Status (neurologic)       PCP: Kilo Bueno MD    History of Present Illness: Patient is a 80year old female with PMH sig for afib on coumadin, admissions over past couple of months for nausea/diarrhea/abd pa CYSTOURETHROSCOPY      Comment: Flow US, Cystoscopy- Dr. Lucas : HERNIA SURGERY      Comment: repair  1979: HYSTERECTOMY      Comment: 1 ovary left  1954: OTHER SURGICAL HISTORY      Comment: ovarian cyst removed during 1st pregnancy  No date: Brando John needed for Diarrhea. Disp: 20 capsule Rfl: 0   Calcium Carbonate-Vitamin D 500-200 MG-UNIT Oral Tab Take 1 tablet by mouth 2 (two) times daily. Disp:  Rfl:    bisacodyl 10 MG Rectal Suppos Place 10 mg rectally daily as needed.  Disp:  Rfl:    Polyethylene G age  [de-identified]- NCAT, anicteric sclera, MMM, OP clear  Lymph- no cervical LAD  CV- RRR no murmurs.  No GAUTAM  Lungs- CTAB, good respiratory effort  Abd- soft, ntnd, no organomegaly, BS+  Derm- no rashes  MSK- good muscle strength and tone, no joint swelling  Neur hemorrhage or extra-axial fluid collection.    Dictated by: Lynn Oppenheim, MD on 3/19/2018 at 15:56     Approved by: Lynn Oppenheim, MD            Xr Chest Ap Portable  (cpt=71045)    Result Date: 3/19/2018  PROCEDURE:  XR CHEST AP PORTABLE  (CPT=710 number: [phone]

## 2018-03-20 NOTE — PROCEDURES
[facility], [address] [place] [address]      PATIENT'S NAME: Keny Mcnamara   ATTENDING PHYSICIAN: Adeline Fan M.D.    PATIENT ACCOUNT #: [de-identified] LOCATION: [address] [place] [address]   MEDICAL RECORD #: [id number] DATE OF BIRTH:

## 2018-03-20 NOTE — CONSULTS
[facility] Dosing Service  Warfarin (Coumadin) Initial Dosing    Brayan Siegel is a 80year old female for whom pharmacy has been consulted to dose warfarin (COUMADIN) for Prevention of systemic embolism by YOSHI Washburn.   Based on this indication,

## 2018-03-20 NOTE — CONSULTS
[facility]    PATIENT'S NAME: Virgilio Javed   ATTENDING PHYSICIAN: Melly Arnett M.D.   CONSULTING PHYSICIAN: Yecenia Astudillo M.D.    PATIENT ACCOUNT#:   [de-identified]    LOCATION:  [address] [place] [address]  MEDICAL RECORD #:   [id number]       DATE OF BIRTH alcohol. REVIEW OF SYSTEMS:  Otherwise negative; she feels fine today. PHYSICAL EXAMINATION:    GENERAL:  She is awake, alert, oriented x3. Pleasant and cooperative. VITAL SIGNS:  Afebrile, blood pressure 147/76, pulse of 76. LUNGS:  Clear.    A weakness  Plan: cont anticoagulation with goal INR >2, increase daily hydration to prevent episode of dehydration  Ok to tx to rehab

## 2018-03-20 NOTE — PLAN OF CARE
NURSING ADMISSION NOTE      Patient admitted via cart  Oriented to room. Safety precautions initiated. Bed in low position. Call light in reach. Pt alert and oriented x2, aware of person and place. VSS and afebrile.   Pt awake and alert upon arriv

## 2018-03-20 NOTE — PHYSICAL THERAPY NOTE
PHYSICAL THERAPY EVALUATION - INPATIENT     Room Number: 430/430-A  Evaluation Date: 3/20/2018  Type of Evaluation: Initial  Physician Order: PT Eval and Treat    Presenting Problem: somnolence  Reason for Therapy: Mobility Dysfunction and Discharge left  1954: OTHER SURGICAL HISTORY      Comment: ovarian cyst removed during 1st pregnancy  No date: TONSILLECTOMY    HOME SITUATION  Type of Home: Independent living facility [facility])   Home Layout: One level                Lives With: Spouse; Care chair with arms (e.g., wheelchair, bedside commode, etc.): A Little   -   Moving from lying on back to sitting on the side of the bed?: None   How much help from another person does the patient currently need. ..   -   Moving to and from a bed to a chair (i Based on this evaluation, patient's clinical presentation is evolving and overall the evaluation complexity is considered moderate.   These impairments and comorbidities manifest themselves as functional limitations in independent bed mobility, transfers, a

## 2018-03-20 NOTE — CM/SW NOTE
03/20/18 1500   CM/SW Referral Data   Referral Source Social Work (self-referral)   Reason for Referral Discharge planning   Informant Children   Patient [facility] Name [facility]   Discharge [place]

## 2018-03-20 NOTE — PROGRESS NOTES
[facility]  Progress note    Wen Osborn Patient Status:  Observation    1931 MRN [id number]   [facility] 4NW-A Attending Keara Garcia MD   Hosp Day # 0 PCP Nano Gaston MD     Cc: weakness    Subjective:   Wen Osborn is evidence of intracranial hemorrhage or extra-axial fluid collection. Dictated by: Mac Curiel MD on 3/19/2018 at 15:56     Approved by: Mac Curiel MD            CXR 3/19/18    · CONCLUSION:  No lobar pneumonia or overt congestive failure. Eating/drinking okay. gen- nad, sitting in chair  cv- rrr  Lungs- ctab  abd- soft    Plan  Discussed with Dr. Renata Hartmann and results reviewed. eeg neg, carotid duplex pending. INR was subtherapeutic.  Will ask pharmacy to dose- home coumadin dosing unclear

## 2018-03-21 VITALS
HEIGHT: 67 IN | HEART RATE: 90 BPM | SYSTOLIC BLOOD PRESSURE: 140 MMHG | TEMPERATURE: 98 F | DIASTOLIC BLOOD PRESSURE: 92 MMHG | WEIGHT: 118.13 LBS | BODY MASS INDEX: 18.54 KG/M2 | RESPIRATION RATE: 18 BRPM | OXYGEN SATURATION: 94 %

## 2018-03-21 LAB
INR BLD: 1.64 (ref 0.9–1.1)
PSA SERPL DL<=0.01 NG/ML-MCNC: 20 SECONDS (ref 12.4–14.7)

## 2018-03-21 PROCEDURE — 85610 PROTHROMBIN TIME: CPT | Performed by: PHYSICIAN ASSISTANT

## 2018-03-21 RX ORDER — WARFARIN SODIUM 4 MG/1
4 TABLET ORAL ONCE
Qty: 1 TABLET | Refills: 0 | Status: SHIPPED | OUTPATIENT
Start: 2018-03-21 | End: 2018-03-21

## 2018-03-21 RX ORDER — WARFARIN SODIUM 2 MG/1
4 TABLET ORAL
Status: DISCONTINUED | OUTPATIENT
Start: 2018-03-21 | End: 2018-03-21

## 2018-03-21 NOTE — DISCHARGE SUMMARY
General Medicine Discharge Summary     Patient ID:  Dali Tovar  80year old  11/8/1931    Admit date: 3/19/2018    Discharge date and time: 3/21/2018     Attending Physician: Erwin Howard MD Procedures:        Patient instructions:         Medication List      CONTINUE taking these medications    acetaminophen 500 MG Tabs  Commonly known as:  TYLENOL EXTRA STRENGTH     ALPRAZolam 0.25 MG Tabs  Commonly known as:  XANAX  Take 1 tablet (0.25 mg known as:  ULTRAM  Take 1 tablet (50 mg total) by mouth every 8 (eight) hours as needed for Pain.      Triamterene-HCTZ 37.5-25 MG Caps  Commonly known as:  DYAZIDE     WARFARIN SODIUM OR        STOP taking these medications    TiZANidine HCl 2 MG Tabs  Com

## 2018-03-21 NOTE — PLAN OF CARE
Pt. A&O x3, forgetful at times. VSS. Afebrile. No c/o pain on shift. Pt. Rested comfortably throughout the night. Pt. To be D/C to [facility] today. Call light within reach. Will continue to monitor.

## 2018-03-21 NOTE — PROGRESS NOTES
Patient dc to [facility]. Son transported. All needs addressed, PIV removed. Attempted to call [facility] for report, stated were not aware of patient.

## 2018-03-21 NOTE — CONSULTS
[facility] Dosing Service  Warfarin (Coumadin) Subsequent Dosing    Latanya Null is a 80year old female for whom pharmacy has been dosing warfarin (Coumadin).  Goal INR is 2-3    Recent Labs   Lab  03/19/18   1355  03/20/18   1529  03/21/18   0619   IN

## 2018-03-21 NOTE — PLAN OF CARE
Achieve highest/safest level of mobility/gait Progressing        Pt AOx3, can be forgetful. Very pleasant. No drowsiness noted during shift. Excellent appetite. VSS. EEG and US carotid negative. Pt feeling very improved since yesterday.  Seen by PT who rola

## 2019-02-28 VITALS — DIASTOLIC BLOOD PRESSURE: 78 MMHG | SYSTOLIC BLOOD PRESSURE: 120 MMHG | HEART RATE: 88 BPM | WEIGHT: 130 LBS

## 2019-02-28 VITALS
WEIGHT: 139.2 LBS | BODY MASS INDEX: 23.19 KG/M2 | HEIGHT: 65 IN | HEART RATE: 84 BPM | DIASTOLIC BLOOD PRESSURE: 68 MMHG | SYSTOLIC BLOOD PRESSURE: 118 MMHG

## 2019-12-08 NOTE — CM/SW NOTE
CHEST 2 VIEWS 



INDICATION: 

cough.



COMPARISON: 

None



FINDINGS:

Support devices: None.



Heart: Within normal limits. 

Lungs/pleura: No acute air space or interstitial disease.  No pneumothorax.



Additional findings: None.



IMPRESSION:

1. No acute findings.



Signer Name: Zach French MD 

Signed: 12/8/2019 2:28 PM

 Workstation Name: [id number] Pt accepted to [facility] with private room. DC time arranged for 4pm. RN to call report to [phone].  RN will notify this worker if Aureliano Holter is requested    Laura Farias

## 2022-12-01 NOTE — CM/SW NOTE
Patient discharged on 12/27/17 as previously planned.        12/28/17 0800   Discharge disposition   Discharged to: [facility] [place] [id number] [address]   Discharge transportation [facility] pt c/o severe abdominal pain since 11pm. PMHx appendix ca with extensive abdominal surgery, HLD

## 2023-01-25 NOTE — PLAN OF CARE
CARDIOVASCULAR - ADULT    • Maintains optimal cardiac output and hemodynamic stability Progressing    • Absence of cardiac arrhythmias or at baseline Progressing        GASTROINTESTINAL - ADULT    • Minimal or absence of nausea and vomiting Progressing Dental

## (undated) NOTE — IP AVS SNAPSHOT
[address]            (For Outpatient Use Only) Initial Admit Date: 12/6/2017   Inpt/Obs Admit Date: Inpt: 12/6/17 / Obs: N/A   Discharge Date:    Ashli Later:  [de-identified]   MRN: [de-identified]   CSN: [id number]        ENCOUNTER  Patient Subscriber Name:  Misty Segundo :    Subscriber ID:  Pt Rel to Subscriber:    Hospital Account Financial Class: Medicare    2017

## (undated) NOTE — ED AVS SNAPSHOT
Dianne Duarte   MRN: [id number]    Department:  [facility] Emergency Department   Date of Visit:  2/17/2018           Disclosure     Insurance plans vary and the physician(s) referred by the ER may not be covered by your plan.  Please contact your tell this physician (or your personal doctor if your instructions are to return to your personal doctor) about any new or lasting problems. The primary care or specialist physician will see patients referred from the [facility] Emergency Department.  Cornell Espinosa

## (undated) NOTE — IP AVS SNAPSHOT
Patient Demographics     Address  [address] [place], [place] [address]  [place] [address] Phone  [phone] (Home) *Preferred* E-mail Address  [email or web address]      Emergency Contact(s)     Name Relation Home Work Mobile    Medardo Lau Spouse [phone]      Ev Generic drug:  Mometasone Furoate      Inhale 1 puff into the lungs daily. CARTIA  MG Cp24  Generic drug:  DilTIAZem HCl ER Coated Beads      Take 180 mg by mouth 2 (two) times daily.           CENTRUM SILVER Tabs      Take 1 tablet by mouth Please  your prescriptions at the location directed by your doctor or nurse    Bring a paper prescription for each of these medications  Acetaminophen-Codeine #3 300-30 MG Tabs  TraMADol HCl 50 MG Tabs           0805-5614-A - MAR ACTION REPORT  (las Type Source Collected On   Blood — 12/09/17 0635          Components    Component Value Reference Range Flag Lab   Potassium 3.4 3.6 - 5.1 mmol/L L B[facility]ES[facility] TIME (PT) [[id number]] (Abnormal)  Resulted: 12/09/17 0702, Result status: backwards. She reported sig pain to her lower back. Currently 8/10. On coumadin. No head trauma. No neck pain.           Past Medical History:   Diagnosis Date   • Abdominal hernia    • Abdominal pain    • AR (allergic rhinitis)     has allergist Sulfa Antibiotics       Anaphylaxis       No current outpatient prescriptions on file. Smoking status: Never Smoker    Smokeless tobacco: Never Used    Alcohol use No    Comment: none        Fam Hx  History reviewed. No pertinent family history.     R INR 2.18 12/07/2017   PTP 24.6 12/07/2017   MG 1.8 12/07/2017       CXR: image personally reviewed.       Radiology: Xr Lumbar Spine (min 4 Views) (cpt=72110)    Result Date: 12/6/2017  PROCEDURE:  XR LUMBAR SPINE (MIN 4 VIEWS) (CPT=72110)  TECHNIQUE:  AP, includes the Dose Index Registry. PATIENT STATED HISTORY: (As transcribed by Technologist)  Patient was trying to help her  from falling and he fell on top of patient. Patient c/o low back pain.     FINDINGS:  The lumbar spine shows no evidence of a Xr Pelvis (complete Min 3 Views) (cpt=72190)    Result Date: 12/6/2017  PROCEDURE:  XR PELVIS (COMPLETE MIN 3 VIEWS) (CPT=72190)  TECHNIQUE:  Three views of the pelvis were obtained. COMPARISON:  None.   INDICATIONS:  back pain  PATIENT STATED HISTORY: (As Author:  Devonte Burnett MD Service:  Orthopedics Author Type:  Physician    Filed:  12/7/2017  2:46 PM Date of Service:  12/7/2017  2:42 PM Status:  Signed    :  Devonte Burnett MD (Physician)     Consult Orders:    1.  IP consult to [address] [place] [address] Right 5 5 5 5 5   Right :  2+ 2+  Left 5 5 5 5 5  /5   Left   :  2+ 2+    Upper Extremities Motor Exam:    Deep Tendon reflexes :     FDP WE WF EE EF Delt     BTR TTR   Right 5 5 5 5 5 5  Right :  2+ 2+  Left 5 5 5 5 5  5 /5  Left   :  2+ 2+    Sacral :  N L2-L3: Minimal diffuse disc bulge with bilateral facet hypertrophy and ligamentum flavum thickening. Narrowing the subarticular zones bilaterally. At least mild spinal canal stenosis.   No significant neural foramina stenosis  L3-L4:  Mild diffuse disc b History of breast cancer in female     Osteopenia of spine     Closed compression fracture of L1 lumbar vertebra (HCC)     Closed compression fracture of first lumbar vertebra, initial encounter ([facility] [address]     Fall, initial encounter        Mechanical DVT pr Pt is an 81 yo with asthma, atrial fibrillation on coumadin, breast cancer in remission, recurrent UTIs, who is admitted for abdominal and chest pain.     # acute L1 compression fracture  - pain control.  PT/OT.  Seen by ortho spine and fitted for brace. Multiple Vitamins-Minerals (CENTRUM SILVER) Oral Tab  Take 1 tablet by mouth daily. Methenamine Hippurate 1 g Oral Tab  Take 1 tablet (1 g total) by mouth daily.     acetaminophen 500 MG Oral Tab  Take 1,000 mg by mouth every 6 (six) hours as needed for Version 2 of 2    Author:  Ketan Gallardo PT Service:  (none) Author Type:  Physical Therapist    Filed:  12/9/2017  1:09 PM Date of Service:  12/8/2017  3:13 PM Status:  Addendum    :  Ketan Gallardo PT (Physical Therapist)    Related Notes:  Or • GERD (gastroesophageal reflux disease)    • Glaucoma    • Heart palpitations    • Hemorrhoids    • Indigestion    • Irregular bowel habits    • Lactose intolerance    • Leaking of urine    • Macular degeneration, bilateral    • Neuropathy     Unclear Beryl · Following Commands:  follows one step commands with increased time  · Safety Judgement:  decreased awareness of need for assistance and decreased awareness of need for safety  · Awareness of Deficits:  decreased awareness of deficits  · Forgetful    RANG Skilled Therapy Provided: Patient presents semi-reclined in bed. Lula brace donned in supine with total A. Pt able to complete supine to sit transfer c Min A for rolling and Min A for trunk support via log roll technique.  Pt able to sit EOB x 10 min c s overall the evaluation complexity is considered moderate. These impairments and comorbidities manifest themselves as functional limitations in independent bed mobility, transfers, and gait.   The patient is below baseline and would benefit from skilled inp Filed:  12/8/2017  3:24 PM Date of Service:  12/8/2017  3:13 PM Status:  Signed    :  Zora Arvizu PT (Physical Therapist)    Related Notes:  Addendum by Zora Arvizu PT (Physical Therapist) filed at 12/9/2017  1:09 PM           PHYSICAL THE • Lactose intolerance    • Leaking of urine    • Macular degeneration, bilateral    • Neuropathy     Unclear Etiology   •  (normal spontaneous vaginal delivery) 20,31,69,81    Vaginal births x 4   • Recurrent UTI     on Daily oral Cefuroxime and Metham Lower extremity ROM is within functional limits     Lower extremity strength is within functional limits at least 3/5 throughout observed with movement against gravity (not formally tested due to acute fx)    BALANCE  Static Sitting: Fair +  Dynamic Sittin interrupted due to transport arrival for imaging (RN notified).   Pt instructed in PT role, POC, d/c planning, DME needs, fall and spine risk precautions, importance of regular mobility, negative effects of bedrest, risk for functional decline, and energy c donning/doffing brace, functional transfers, bed mobility, and community ambulation. DISCHARGE RECOMMENDATIONS  PT Discharge Recommendations: Sub-acute rehabilitation (ELOS 14-17 days)    PLAN  PT Treatment Plan: Bed mobility; Body mechanics; Coordination Author:  Eduard Paniagua OT Service:  (none) Author Type:  Occupational Therapist    Filed:  12/9/2017  8:44 AM Date of Service:  12/9/2017  8:39 AM Status:  Signed    :  Eduard Paniagua OT (Occupational Therapist)       OCCUPATIONAL THERAPY • Lactose intolerance    • Leaking of urine    • Macular degeneration, bilateral    • Neuropathy     Unclear Etiology   •  (normal spontaneous vaginal delivery) 08,46,29,63    Vaginal births x 4   • Recurrent UTI     on Daily oral Cefuroxime and Metham Approx Degree of Impairment: 53.32%  Standardized Score (AM-PAC Scale): 35.96  CMS Modifier (G-Code): CK    FUNCTIONAL TRANSFER ASSESSMENT  Supine to Sit : Minimum assistance  Sit to Stand: Minimum assistance    Skilled Therapy Provided: Pt is motivated to Frequency (Obs): 5x/week      OT Goals:   ADL GOALS:  Patient will perform lower body dressing w/ min A and with adaptive equipment PRN  - progressing  Patient will perform toileting with min A and with adaptive equipment PRN.   - progressing     Functional Problem List[AO.1]  Principal Problem:    Closed compression fracture of first lumbar vertebra, initial encounter [facility])  Active Problems:    Closed compression fracture of L1 lumbar vertebra ([address]    Fall, initial encounter[AO.2]      Past Medical History[ OCCUPATIONAL PROFILE    HOME SITUATION[AO.1]  Type of Home: Independent living facility  Home Layout: One level  Lives With: Spouse    Toilet and Equipment: Comfort height toilet  Shower/Tub and Equipment: Walk-in shower; Shower chair       Occupation/Statu BP:[AO.1] 155/91[AO.3]      ACTIVITIES OF DAILY LIVING ASSESSMENT  AM-PAC ‘6-Clicks’ Inpatient Daily Activity Short Form  How much help from another person does the patient currently need…[AO.1]  -   Putting on and taking off regular lower body clothing?: The AM-JULIA ' '6-Clicks' Inpatient Daily Activity Short Form was completed and  this patient  is demonstrating a  60% degree impairment in activities of daily living. Research supports that patients with this level of impairment often benefit from NITHIN. Terrance Jensen education;Patient/Family training;Equipment eval/education; Compensatory technique education  Rehab Potential : Good  Frequency (Obs): 5x/week  Number of Visits to Meet Established Goals: Brady Trejo. 2]    ADL GOALS:  Patient will perform lower body dressing w/ mi Garamycin Gentamicin Up To 80mg, Im Inj 08/09/11     Garamycin Gentamicin Up To 80mg, Im Inj 08/05/11     INFLUENZA 10/01/15     INFLUENZA 10/03/13     INFLUENZA 02/07/12     Pneumococcal (Prevnar 13) 07/17/17       Future Appointments        Provider Dep

## (undated) NOTE — IP AVS SNAPSHOT
[address]            (For Outpatient Use Only) Initial Admit Date: 12/25/2017   Inpt/Obs Admit Date: Inpt: N/A / Obs: 12/25/17   Discharge Date:    Mario Alberto Gonzales:  [de-identified]   MRN: [de-identified]   CSN: [id number]        [facility] Subscriber ID:  Pt Rel to Subscriber:    Hospital Account Financial Class: Medicare    December 27, 2017

## (undated) NOTE — IP AVS SNAPSHOT
[facility] [place] [address] [place], [address] [place] [address] Rd ~ [phone]                Discharge Summary   6/14/2017    Vernon Villa           Admission Information        Provider Department    6/14/2017 Hortensia Bruce MD  2ne-A - how to take this  - when to take this  - additional instructions        TAKE ONE TABLET BY MOUTH NIGHTLY    Jade Haider                           Cefuroxime Axetil 250 MG Tabs   Commonly known as:  CEFTIN   What changed:    - how much to take  - how t Last time this was given:  40 mg on 6/15/2017  8:53 AM   Commonly known as:  BENTYL        Take 2 tablets (40 mg total) by mouth once daily.     Coco Avilez                           digoxin 0.25 MG Tabs   Last time this was given:  125 mcg on 6/15/20 Bring a paper prescription for each of these medications    - docusate sodium 100 MG Caps  - simethicone 80 MG Chew            Discharge References/Attachments     SIMETHICONE ORAL TABLETS OR CAPSULES (ENGLISH)    DOCUSATE CAPSULES (ENGLISH)      Follow-u 32.2 (06/15/17)  9.3 (06/15/17)  4.6 (06/15/17)  0.5 -- (06/15/17)  2.90 (06/15/17)  1.76 (06/15/17)  0.51 (06/15/17)  0.25 (06/15/17)  0.03    (06/14/17)  53.5 (06/14/17)  32.4 (06/14/17)  8.8 (06/14/17)  4.7 (06/14/17)  0.4  (06/14/17)  2.75 (06/14/17) Casandra                    _____________________________________________________________________________    Medication Side Effects - Medications to be taken at home  As your caregivers, we want you to be aware of the medications you are prescribed to take Most common side effects: Dizziness, constipation, abnormal liver function   What to report to your healthcare team:  Dizziness, muscle aches, constipation           Blood Thinners (Anticoagulants)     Warfarin Sodium 2 MG Oral Tab    Warfarin Sodium 2 MG What to report to your healthcare team: Changes in thinking, confusion, palpitations           General Nerve Function Medications     gabapentin 300 MG Oral Cap       Use:  Treat conditions such as seizures, headaches, depression, anxiety and other neurolo